# Patient Record
Sex: FEMALE | Race: WHITE
[De-identification: names, ages, dates, MRNs, and addresses within clinical notes are randomized per-mention and may not be internally consistent; named-entity substitution may affect disease eponyms.]

---

## 2017-06-25 ENCOUNTER — HOSPITAL ENCOUNTER (EMERGENCY)
Dept: HOSPITAL 14 - H.ER | Age: 67
Discharge: HOME | End: 2017-06-25
Payer: COMMERCIAL

## 2017-06-25 VITALS
HEART RATE: 78 BPM | OXYGEN SATURATION: 99 % | DIASTOLIC BLOOD PRESSURE: 67 MMHG | RESPIRATION RATE: 19 BRPM | SYSTOLIC BLOOD PRESSURE: 136 MMHG | TEMPERATURE: 98.4 F
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
## 2017-06-25 DIAGNOSIS — M54.2: ICD-10-CM

## 2017-06-25 DIAGNOSIS — S09.90XA: Primary | ICD-10-CM
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
## 2017-06-25 NOTE — ED PDOC
HPI:  Headache


Time Seen by Provider: 06/25/17 15:25


Chief Complaint (Provider): Headache 


History Per: Patient


History/Exam Limitations: no limitations


Onset/Duration Of Symptoms: Days (3)


Current Symptoms Are (Timing): Still Present


Severity: Moderate


Additional Complaint(s): 


[place] is a 65 y/o female presenting to the ER on 6/25/2017 with 

complaints of a headache, dizziness, and neck pain for three days. Patient 

reports falling off a ladder to her bunk bed three days ago. She states she 

fell backwards and hit her head on the ground. At the time, she did not 

experience any lost of consciousness. However, she is complaining today of 

dizziness, neck pain, and headaches. She denies any weakness or paresthesia. 





Past Medical History


Reviewed: Historical Data, Nursing Documentation, Vital Signs


Vital Signs: 





 Last Vital Signs











Temp  98.4 F   06/25/17 15:18


 


Pulse  78   06/25/17 15:18


 


Resp  19   06/25/17 15:18


 


BP  136/67   06/25/17 15:18


 


Pulse Ox  99   06/25/17 15:18














- Medical History


PMH: Asthma





- Surgical History


Surgical History: No Surg Hx





- Family History


Family History: States: Unknown Family Hx





- Social History


Current smoker - smoking cessation education provided: No


Alcohol: None


Drugs: Denies





- Home Medications


Home Medications: 


 Ambulatory Orders











 Medication  Instructions  Recorded


 


Cyclobenzaprine HCl [Flexeril] 10 mg PO TID #21 tab 08/10/15


 


Naproxen 500 mg PO Q12 #20 tab 08/10/15


 


Naproxen [Naprosyn] 500 mg PO Q12H #20 tab 06/25/17














- Allergies


Allergies/Adverse Reactions: 


 Allergies











Allergy/AdvReac Type Severity Reaction Status Date / Time


 


codeine AdvReac  DIARRHEA Verified 06/25/17 15:14














Review of Systems


ROS Statement: Except As Marked, All Systems Reviewed And Found Negative


Cardiovascular: Negative for: Light Headedness


Musculoskeletal: Positive for: Neck Pain


Neurological: Positive for: Headache, Dizziness, Other ((-) paraesthesia ).  

Negative for: Weakness





Physical Exam





- Reviewed


Nursing Documentation Reviewed: Yes


Vital Signs Reviewed: Yes





- Physical Exam


Appears: Positive for: Non-toxic, No Acute Distress


Head Exam: Positive for: ATRAUMATIC, NORMOCEPHALIC


Skin: Positive for: Normal Color.  Negative for: Rash


Eye Exam: Positive for: Normal appearance, EOMI, PERRL


Neck: Positive for: Normal (no spinal tenderness or deformity), Painless ROM, 

Supple


Cardiovascular/Chest: Positive for: Regular Rate, Rhythm.  Negative for: Murmur


Respiratory: Positive for: Normal Breath Sounds.  Negative for: Wheezing, 

Respiratory Distress


Gastrointestinal/Abdominal: Positive for: Normal Exam, Soft.  Negative for: 

Tenderness


Back: Positive for: Normal Inspection (no spinal tenderness or deformity).  

Negative for: L CVA Tenderness, R CVA Tenderness, Vertebral Tenderness


Extremity: Positive for: Normal ROM.  Negative for: Tenderness, Deformity


Neurologic/Psych: Positive for: Alert, Oriented, Other (no focal deficits ).  

Negative for: Motor/Sensory Deficits





- ECG


O2 Sat by Pulse Oximetry: 99





Medical Decision Making


Medical Decision Making: 


15:25





Initial Impression- 65 y/o female with headaches, dizziness, and neck pain s/p 

fall 





Initial Plan-


* CT Cervical Spine w/o contrast


* CT Head w/o contrast


* Re-evaluate





Documented by Juan Pablo Daley, acting as a scribe for Shayan Alvarez MD.





All medical record entries made by the Scribe were at my direction and 

personally dictated by me. I


have reviewed the chart and agree that the record accurately reflects my 

personal performance of the


history, physical exam, medical decision making, and the department course for 

this patient. I have


also personally directed, reviewed, and agree with the discharge instructions 

and disposition.





Disposition





- Clinical Impression


Clinical Impression: 


 Head injury, Neck pain








- Patient ED Disposition


Is Patient to be Admitted: No





- Disposition


Referrals: 


[facility] [Outside]


Disposition: Routine/Home


Disposition Time: 17:50


Condition: FAIR


Prescriptions: 


Naproxen [Naprosyn] 500 mg PO Q12H #20 tab


Instructions:  Head Injury (ED), Concussion (ED), Cervical Sprain (ED)


Print Language: Armenian

## 2017-06-25 NOTE — CT
PROCEDURE:  CT HEAD WITHOUT CONTRAST.



HISTORY:

r/o bleed



COMPARISON:

None available. 



TECHNIQUE:

Axial computed tomography images were obtained through the head/brain 

without intravenous contrast.  



Radiation dose:



Total exam DLP = 1172.07 mGy-cm.



This CT exam was performed using one or more of the following dose 

reduction techniques: Automated exposure control, adjustment of the 

mA and/or kV according to patient size, and/or use of iterative 

reconstruction technique.



FINDINGS:



HEMORRHAGE:

No acute parenchymal, subarachnoid or extra-axial hemorrhage. 



BRAIN:

No evidence of large acute infarct.  No focal parenchymal nor 

extra-axial masses or collections identified on this noncontrast 

study.  Ventricular and sulcal size are within range of normal for 

this patient's stated age.



Minor vascular calcifications are present.



VENTRICLES:

No evidence of obstructive hydrocephalus. 



CALVARIUM:

Calvarium appears grossly intact.



PARANASAL SINUSES:

Frontal sinuses are hypoplastic.  Remaining visualized paranasal 

sinuses well-developed and currently well-aerated. No fluid levels 

seen to suggest acute hemorrhage or sinusitis. 



MASTOID AIR CELLS:

Unremarkable as visualized. No inflammatory changes.



OTHER FINDINGS:

Note made of a small approximately 4 mm elliptical shaped 

calcification which appears to be located on the superior surface of 

the left tentorial leaf.  This could represent a simple dural base 

calcification however the possibility of a very tiny incidental 

meningioma calcified meningioma cannot be completely excluded. .  

Followup nonemergent pre and post-contrast MRI of the brain could be 

performed further evaluation if clinically indicated. . 



IMPRESSION:

No acute intracranial hemorrhage. 



Note made of a small approximately 4 mm elliptical shaped 

calcification which appears to be located on the superior surface of 

the left tentorial leaf.  This could represent a simple dural base 

calcification however the possibility of a very tiny incidental 

meningioma calcified meningioma cannot be completely excluded. .  

Followup nonemergent pre and post-contrast MRI of the brain could be 

performed further evaluation if clinically indicated.

## 2017-06-25 NOTE — CT
PROCEDURE:  CT Cervical Spine without contrast



HISTORY:

Trauma



COMPARISON:

None available.



TECHNIQUE:

Axial computed tomography images were obtained of the cervical spine 

without the use of intravenous contrast. Coronal and sagittal 

reformatted images were created and reviewed.



Radiation dose: 



Total exam DLP = 662.4 mGy-cm.



This CT exam was performed using one or more of the following dose 

reduction techniques: Automated exposure control, adjustment of the 

mA and/or kV according to patient size, and/or use of iterative 

reconstruction technique.



FINDINGS:



VERTEBRAE:

No acute compression fractures no retropulsed fragments. .  Vertebral 

bodies exhibit relatively normal stature. Minor anterior subluxation 

C7 over T1.  Remaining vertebral bodies otherwise exhibit normal 

alignment.  Facets normally aligned. 



DISCS/SPINAL CANAL/NEURAL FORAMINA:

Multilevel degenerative spondylosis.



At the C2-C3 level, there is adequate disc height.  Minimal central 

and bilateral disc bulge indents the ventral surface of the thecal 

sac nearly reaching but not significantly compressing the ventral 

surface of the cord. Central canal appears and at exit foramina 

appear adequate.



At the C3-C4 level, there is relatively adequate disc height. Small 

focal central and bilateral disc bulge indents the ventral surface of 

the thecal sac and minimally indents the ventral surface of cord.  

Central canal is marginal to slightly narrowed. Exit foramina appear 

adequate.



At the C4-C5 level, there is relatively adequate disc height. Small 

central and bilateral disc bulge also indents the ventral surface of 

the thecal sac reaching but not significantly compressing the ventral 

surface of the cord. Central canal appears adequate. Left facet joint 

is hypertrophic.  The uncovertebral joints also mildly hypertrophic.  

Exit foramina are marginal to minimally narrowed. 



At the C5-C6 level, there is minor disc space narrowing.  Small 

broad-based disc bulge ridge complex contiguous with mildly 

hypertrophic uncovertebral joints left larger than right.  The left 

facet is also hypertrophic.  The central canal appears adequate. Exit 

foramina are narrowed bilaterally left greater than right. 



Similar changes seen at the C6-C7 level.  Slight anterior subluxation 

C7-T1 level. 



PARASPINAL SOFT TISSUES:

Unremarkable. 



OTHER FINDINGS:

None.



IMPRESSION:

No acute fractures.  Mild multilevel degenerative spondylosis as 

detailed above. 



Slight anterior subluxation C7 over T1.

## 2018-05-01 ENCOUNTER — HOSPITAL ENCOUNTER (INPATIENT)
Dept: HOSPITAL 31 - C.ER | Age: 68
LOS: 3 days | Discharge: HOME | DRG: 394 | End: 2018-05-04
Attending: FAMILY MEDICINE | Admitting: FAMILY MEDICINE
Payer: COMMERCIAL

## 2018-05-01 VITALS — BODY MASS INDEX: 26.7 KG/M2

## 2018-05-01 DIAGNOSIS — J44.9: ICD-10-CM

## 2018-05-01 DIAGNOSIS — Z98.51: ICD-10-CM

## 2018-05-01 DIAGNOSIS — K52.9: ICD-10-CM

## 2018-05-01 DIAGNOSIS — K44.9: ICD-10-CM

## 2018-05-01 DIAGNOSIS — K21.9: ICD-10-CM

## 2018-05-01 DIAGNOSIS — K92.1: ICD-10-CM

## 2018-05-01 DIAGNOSIS — K76.0: ICD-10-CM

## 2018-05-01 DIAGNOSIS — K63.3: Primary | ICD-10-CM

## 2018-05-01 DIAGNOSIS — Z87.891: ICD-10-CM

## 2018-05-01 DIAGNOSIS — K64.8: ICD-10-CM

## 2018-05-01 DIAGNOSIS — K29.50: ICD-10-CM

## 2018-05-01 LAB
ALBUMIN SERPL-MCNC: 4.1 G/DL (ref 3.5–5)
ALBUMIN/GLOB SERPL: 1.1 {RATIO} (ref 1–2.1)
ALT SERPL-CCNC: 43 U/L (ref 9–52)
APTT BLD: 30 SECONDS (ref 21–34)
AST SERPL-CCNC: 66 U/L (ref 14–36)
BASOPHILS # BLD AUTO: 0.1 K/UL (ref 0–0.2)
BASOPHILS NFR BLD: 0.6 % (ref 0–2)
BILIRUB UR-MCNC: NEGATIVE MG/DL
BUN SERPL-MCNC: 14 MG/DL (ref 7–17)
CALCIUM SERPL-MCNC: 8.7 MG/DL (ref 8.6–10.4)
EOSINOPHIL # BLD AUTO: 0.3 K/UL (ref 0–0.7)
EOSINOPHIL NFR BLD: 2.7 % (ref 0–4)
ERYTHROCYTE [DISTWIDTH] IN BLOOD BY AUTOMATED COUNT: 13.4 % (ref 11.5–14.5)
GFR NON-AFRICAN AMERICAN: > 60
GLUCOSE UR STRIP-MCNC: NORMAL MG/DL
HGB BLD-MCNC: 13.7 G/DL (ref 11–16)
INR PPP: 1.1
LEUKOCYTE ESTERASE UR-ACNC: (no result) LEU/UL
LIPASE: 35 U/L (ref 23–300)
LYMPHOCYTES # BLD AUTO: 2.2 K/UL (ref 1–4.3)
LYMPHOCYTES NFR BLD AUTO: 18.1 % (ref 20–40)
MCH RBC QN AUTO: 30.8 PG (ref 27–31)
MCHC RBC AUTO-ENTMCNC: 33.8 G/DL (ref 33–37)
MCV RBC AUTO: 91.1 FL (ref 81–99)
MONOCYTES # BLD: 0.9 K/UL (ref 0–0.8)
MONOCYTES NFR BLD: 7.5 % (ref 0–10)
NEUTROPHILS # BLD: 8.4 K/UL (ref 1.8–7)
NEUTROPHILS NFR BLD AUTO: 71.1 % (ref 50–75)
NRBC BLD AUTO-RTO: 0 % (ref 0–2)
PH UR STRIP: 5 [PH] (ref 5–8)
PLATELET # BLD: 268 K/UL (ref 130–400)
PMV BLD AUTO: 7.6 FL (ref 7.2–11.7)
PROT UR STRIP-MCNC: NEGATIVE MG/DL
PROTHROMBIN TIME: 12.8 SECONDS (ref 9.7–12.2)
RBC # BLD AUTO: 4.45 MIL/UL (ref 3.8–5.2)
RBC # UR STRIP: (no result) /UL
SP GR UR STRIP: 1.03 (ref 1–1.03)
SQUAMOUS EPITHIAL: 1 /HPF (ref 0–5)
UROBILINOGEN UR-MCNC: NORMAL MG/DL (ref 0.2–1)
WBC # BLD AUTO: 11.9 K/UL (ref 4.8–10.8)

## 2018-05-01 NOTE — CP.PCM.HP
<NoahSantiago - Last Filed: 05/01/18 22:21>





History of Present Illness





- History of Present Illness


History of Present Illness: 


CC: Abdominal [id number]





PMD: Dr. Meyer


Code Status: DNR/DNI as per patient however Dr. Tran wishes patient to think 

about it more before entering into computer formally 





This patient is a 68yo F w/ a PMhx of COPD/Asthma, former smoker 50+ pack years 

quit 10 years ago who is coming to the hospital with a 1d history of LLQ pain. 

She denies fevers/chills, HA, CP, SOB, N/V/D, dysuria/frequency/urgency, or 

lower extremity pain/swelling. She has never had pain like this before. She 

went to her PMD, who told her to come to the ER today. She was recently in 

[facility], 3 months ago, and states that she had "stomach problems" there and 

took unknown antibiotics which made it better. Other than that, she has never 

had this before. 





As per patient her stools were "dark and tarry" black in color, every time she 

went to the bathroom. Of note, ER did rectal and found brown stool with a 

negative FOBT. 





PMhx: Asthma/COPD


Meds: Tramadol PRN, Prednisone 10mg PRN for exacerbations,  Pulmicort and advair

, proair PRN


Allergies: Codeine; hypotension


Surgeries: C Section, Knee surgery





Present on Admission





- Present on Admission


Any Indicators Present on Admission: Yes


History of DVT/PE: No


History of Uncontrolled Diabetes: No


Urinary Catheter: No


Decubitus Ulcer Present: No





Past Patient History





- Past Social History


Smoking Status: Former Smoker





- PULMONARY


Hx Asthma: Yes





- MUSCULOSKELETAL/RHEUMATOLOGICAL


Hx Musculoskeletal Disorders: Yes


Hx Degenerative Joint Disease: Yes (Cervical/left shoulder)





- PSYCHIATRIC


Hx Substance Use: No





- SURGICAL HISTORY


Hx Surgeries: No





- ANESTHESIA


Hx Anesthesia: No





Meds


Allergies/Adverse Reactions: 


 Allergies











Allergy/AdvReac Type Severity Reaction Status Date / Time


 


codeine AdvReac  DIARRHEA Verified 05/01/18 17:34














Physical Exam





- Constitutional


Appears: Non-toxic, No Acute Distress





- Head Exam


Head Exam: ATRAUMATIC, NORMAL INSPECTION





- Eye Exam


Eye Exam: EOMI, Normal appearance, PERRL, Scleral icterus





- ENT Exam


ENT Exam: Mucous Membranes Moist





- Neck Exam


Neck exam: Positive for: Full Rom.  Negative for: Lymphadenopathy, Thyromegaly





- Respiratory Exam


Respiratory Exam: Clear to Auscultation Bilateral, Wheezes (minor expiratory 

wheezes), NORMAL BREATHING PATTERN.  absent: Rales, Rhonchi





- Cardiovascular Exam


Cardiovascular Exam: REGULAR RHYTHM, +S1, +S2





- GI/Abdominal Exam


GI & Abdominal Exam: Normal Bowel Sounds, Tenderness (LLQ).  absent: Firm, 

Guarding, Hernia, Hyperactive Bowel Sounds, Mass, Pulsatile Mass, Rebound, Rigid





- Extremities Exam


Extremities exam: Positive for: full ROM, normal inspection.  Negative for: 

calf tenderness, pedal edema, tenderness





- Back Exam


Back exam: NORMAL INSPECTION.  absent: CVA tenderness (L), CVA tenderness (R)





- Neurological Exam


Neurological exam: Alert, CN II-XII Intact, Oriented x3





Results





- Vital Signs


Recent Vital Signs: 





 Last Vital Signs











Temp  98.6 F   05/01/18 20:51


 


Pulse  78   05/01/18 20:51


 


Resp  18   05/01/18 20:51


 


BP  129/79   05/01/18 20:51


 


Pulse Ox  98   05/01/18 20:51














- Labs


Result Diagrams: 


 05/01/18 18:28





 05/01/18 18:28


Labs: 





 Laboratory Results - last 24 hr











  05/01/18 05/01/18 05/01/18





  18:21 18:28 18:28


 


WBC   11.9 H 


 


RBC   4.45 


 


Hgb   13.7 


 


Hct   40.6 


 


MCV   91.1 


 


MCH   30.8 


 


MCHC   33.8 


 


RDW   13.4 


 


Plt Count   268 


 


MPV   7.6 


 


Neut % (Auto)   71.1 


 


Lymph % (Auto)   18.1 L 


 


Mono % (Auto)   7.5 


 


Eos % (Auto)   2.7 


 


Baso % (Auto)   0.6 


 


Neut # (Auto)   8.4 H 


 


Lymph # (Auto)   2.2 


 


Mono # (Auto)   0.9 H 


 


Eos # (Auto)   0.3 


 


Baso # (Auto)   0.1 


 


PT   


 


INR   


 


APTT   


 


Sodium    142


 


Potassium    5.4 H


 


Chloride    101


 


Carbon Dioxide    30


 


Anion Gap    16


 


BUN    14


 


Creatinine    0.7


 


Est GFR ( Amer)    > 60


 


Est GFR (Non-Af Amer)    > 60


 


Random Glucose    100


 


Calcium    8.7


 


Total Bilirubin    1.6 H


 


AST    66 H


 


ALT    43


 


Alkaline Phosphatase    79


 


Troponin I    < 0.0120


 


Total Protein    7.8


 


Albumin    4.1


 


Globulin    3.8


 


Albumin/Globulin Ratio    1.1


 


Lipase    35


 


Urine Color  Cheyanne  


 


Urine Clarity  Hazy  


 


Urine pH  5.0  


 


Ur Specific Gravity  1.029  


 


Urine Protein  Negative  


 


Urine Glucose (UA)  Normal  


 


Urine Ketones  Negative  


 


Urine Blood  Trace H  


 


Urine Nitrate  Negative  


 


Urine Bilirubin  Negative  


 


Urine Urobilinogen  Normal  


 


Ur Leukocyte Esterase  Trace  


 


Urine WBC (Auto)  2  


 


Urine RBC (Auto)  2  


 


Ur Squamous Epith Cells  1  


 


Stool Occult Blood   


 


Blood Type   


 


Antibody Screen   














  05/01/18 05/01/18 05/01/18





  18:28 18:28 18:28


 


WBC   


 


RBC   


 


Hgb   


 


Hct   


 


MCV   


 


MCH   


 


MCHC   


 


RDW   


 


Plt Count   


 


MPV   


 


Neut % (Auto)   


 


Lymph % (Auto)   


 


Mono % (Auto)   


 


Eos % (Auto)   


 


Baso % (Auto)   


 


Neut # (Auto)   


 


Lymph # (Auto)   


 


Mono # (Auto)   


 


Eos # (Auto)   


 


Baso # (Auto)   


 


PT  12.8 H  


 


INR  1.1  


 


APTT  30  


 


Sodium   


 


Potassium   


 


Chloride   


 


Carbon Dioxide   


 


Anion Gap   


 


BUN   


 


Creatinine   


 


Est GFR ( Amer)   


 


Est GFR (Non-Af Amer)   


 


Random Glucose   


 


Calcium   


 


Total Bilirubin   


 


AST   


 


ALT   


 


Alkaline Phosphatase   


 


Troponin I   


 


Total Protein   


 


Albumin   


 


Globulin   


 


Albumin/Globulin Ratio   


 


Lipase   


 


Urine Color   


 


Urine Clarity   


 


Urine pH   


 


Ur Specific Gravity   


 


Urine Protein   


 


Urine Glucose (UA)   


 


Urine Ketones   


 


Urine Blood   


 


Urine Nitrate   


 


Urine Bilirubin   


 


Urine Urobilinogen   


 


Ur Leukocyte Esterase   


 


Urine WBC (Auto)   


 


Urine RBC (Auto)   


 


Ur Squamous Epith Cells   


 


Stool Occult Blood    Negative


 


Blood Type   O POSITIVE 


 


Antibody Screen   Negative 














Assessment & Plan





- Assessment and Plan (Free Text)


Assessment: 


68yo F admitted for colitis with possible GI bleed 





Colitis


-elevated WBC, no tachycardia, no fever, solid blood pressure


-f/u stool studies


-f/u GI consult; Dr. Scott; thank you for your help


-FOBT negative in ER, ER physician states was brown stool not black that he had 

on exam with no blood


   -given Zosyn in ER


-will continue with Ceftriaxone and Metronidazole as per Dr. Tran


-f/u blood cultures


-of note patient has never had colonoscopy


-maintanence fluids 150ml/hr 2/2 to NPO  





Hx of Asthma/COPD


-c/w home pumps


-duonebs PRN for wheezing


-O2 100% on RA





elevated liver enzymes


-f/u stool studies


-f/u hepatitis





Proph


-Protonix IV 40mg as per Dr. Tran


-SCD as possible bleed


-please revisit code status conversation again as patient expressed wishes to 

be DNR/DNI but does not have advanced directive with her. 





Discussed and seen with Dr. Chelsea Salazar PGY2 





Decision To Admit





- Pt Status Changed To:


Hospital Disposition Of: Observation





- .


Bed Request Type: Regular


Admitting Physician: Titus Tran





<Titus Tran - Last Filed: 05/02/18 06:31>





Results





- Vital Signs


Recent Vital Signs: 





 Last Vital Signs











Temp  98.5 F   05/01/18 23:53


 


Pulse  78   05/01/18 23:53


 


Resp  20   05/01/18 23:53


 


BP  102/64   05/01/18 23:53


 


Pulse Ox  99   05/02/18 00:26














- Labs


Result Diagrams: 


 05/01/18 18:28





 05/01/18 18:28


Labs: 





 Laboratory Results - last 24 hr











  05/01/18 05/01/18 05/01/18





  18:21 18:28 18:28


 


WBC   11.9 H 


 


RBC   4.45 


 


Hgb   13.7 


 


Hct   40.6 


 


MCV   91.1 


 


MCH   30.8 


 


MCHC   33.8 


 


RDW   13.4 


 


Plt Count   268 


 


MPV   7.6 


 


Neut % (Auto)   71.1 


 


Lymph % (Auto)   18.1 L 


 


Mono % (Auto)   7.5 


 


Eos % (Auto)   2.7 


 


Baso % (Auto)   0.6 


 


Neut # (Auto)   8.4 H 


 


Lymph # (Auto)   2.2 


 


Mono # (Auto)   0.9 H 


 


Eos # (Auto)   0.3 


 


Baso # (Auto)   0.1 


 


PT   


 


INR   


 


APTT   


 


Sodium    142


 


Potassium    5.4 H


 


Chloride    101


 


Carbon Dioxide    30


 


Anion Gap    16


 


BUN    14


 


Creatinine    0.7


 


Est GFR ( Amer)    > 60


 


Est GFR (Non-Af Amer)    > 60


 


Random Glucose    100


 


Calcium    8.7


 


Total Bilirubin    1.6 H


 


AST    66 H


 


ALT    43


 


Alkaline Phosphatase    79


 


Troponin I    < 0.0120


 


Total Protein    7.8


 


Albumin    4.1


 


Globulin    3.8


 


Albumin/Globulin Ratio    1.1


 


Lipase    35


 


Urine Color  Cheyanne  


 


Urine Clarity  Hazy  


 


Urine pH  5.0  


 


Ur Specific Gravity  1.029  


 


Urine Protein  Negative  


 


Urine Glucose (UA)  Normal  


 


Urine Ketones  Negative  


 


Urine Blood  Trace H  


 


Urine Nitrate  Negative  


 


Urine Bilirubin  Negative  


 


Urine Urobilinogen  Normal  


 


Ur Leukocyte Esterase  Trace  


 


Urine WBC (Auto)  2  


 


Urine RBC (Auto)  2  


 


Ur Squamous Epith Cells  1  


 


Stool Occult Blood   


 


Blood Type   


 


Antibody Screen   














  05/01/18 05/01/18 05/01/18





  18:28 18:28 18:28


 


WBC   


 


RBC   


 


Hgb   


 


Hct   


 


MCV   


 


MCH   


 


MCHC   


 


RDW   


 


Plt Count   


 


MPV   


 


Neut % (Auto)   


 


Lymph % (Auto)   


 


Mono % (Auto)   


 


Eos % (Auto)   


 


Baso % (Auto)   


 


Neut # (Auto)   


 


Lymph # (Auto)   


 


Mono # (Auto)   


 


Eos # (Auto)   


 


Baso # (Auto)   


 


PT  12.8 H  


 


INR  1.1  


 


APTT  30  


 


Sodium   


 


Potassium   


 


Chloride   


 


Carbon Dioxide   


 


Anion Gap   


 


BUN   


 


Creatinine   


 


Est GFR ( Amer)   


 


Est GFR (Non-Af Amer)   


 


Random Glucose   


 


Calcium   


 


Total Bilirubin   


 


AST   


 


ALT   


 


Alkaline Phosphatase   


 


Troponin I   


 


Total Protein   


 


Albumin   


 


Globulin   


 


Albumin/Globulin Ratio   


 


Lipase   


 


Urine Color   


 


Urine Clarity   


 


Urine pH   


 


Ur Specific Gravity   


 


Urine Protein   


 


Urine Glucose (UA)   


 


Urine Ketones   


 


Urine Blood   


 


Urine Nitrate   


 


Urine Bilirubin   


 


Urine Urobilinogen   


 


Ur Leukocyte Esterase   


 


Urine WBC (Auto)   


 


Urine RBC (Auto)   


 


Ur Squamous Epith Cells   


 


Stool Occult Blood    Negative


 


Blood Type   O POSITIVE 


 


Antibody Screen   Negative 














Attending/Attestation





- Attestation


I have personally seen and examined this patient.: Yes


I have fully participated in the care of the patient.: Yes


I have reviewed all pertinent clinical information: Yes


Notes (Text): 





Assessment


* Diagnosed colitis a day before in different hospital, presented with coliky 

pain and mentions black tarry stool, clinically less likely of the UGI bleed as 

BUN is normal, patient is hemodynamically stable and stool in ER was brown, 

guiac negative, but will give benefit of doubt to patient.


* H/o asthma


Plan


* Rocephin, flagyl


* PPI


* Stool studies


* GI consult


* DVT prophylaxis scd, till sure about gi bleeding.


* See orders for detail.

## 2018-05-01 NOTE — C.PDOC
History Of Present Illness


66 y/o female, w/PMhx of asthma, presents to the ER for evaluation of lower 

abdominal pain and dark stools which have been present for the past 2 weeks. 

Patient has been referred by her PMD. Patient denies having fever, hemoptysis, 

and other complaints at this time.


Time Seen by Provider: 18 17:56


Chief Complaint (Nursing): Abdominal Pain


History Per: Patient


History/Exam Limitations: no limitations


Onset/Duration Of Symptoms: Days


Current Symptoms Are (Timing): Still Present





Past Medical History


Reviewed: Historical Data, Nursing Documentation, Vital Signs


Vital Signs: 


 Last Vital Signs











Temp  98.5 F   18 23:53


 


Pulse  78   18 23:53


 


Resp  20   18 23:53


 


BP  102/64   18 23:53


 


Pulse Ox  97   18 23:53














- Medical History


PMH: Asthma, Back Problems


Surgical History: No Surg Hx





- [facility] Procedures








NAIL REMOVAL (14)


ONYCHOPLASTY (14)








Family History: States: No Known Family Hx





- Social History


Hx Alcohol Use: No


Hx Substance Use: No





- Immunization History


Hx Tetanus Toxoid Vaccination: No


Hx Influenza Vaccination: No


Hx Pneumococcal Vaccination: Yes (4-years ago)





Review Of Systems


Except As Marked, All Systems Reviewed And Found Negative.


Constitutional: Negative for: Fever, Chills


Respiratory: Negative for: Hemoptysis


Gastrointestinal: Positive for: Abdominal Pain, Other (dark colored stool)





Physical Exam





- Physical Exam


Appears: Non-toxic, No Acute Distress


Skin: Normal Color, Warm, Dry


Head: Atraumatic, Normacephalic


Eye(s): bilateral: Normal Inspection


Nose: Normal


Oral Mucosa: Moist


Neck: Supple


Chest: Symmetrical


Cardiovascular: Rhythm Regular


Respiratory: Normal Breath Sounds, No Rales, No Rhonchi, No Wheezing


Gastrointestinal/Abdominal: Soft, Tenderness (epigastric tenderness)


Neurological/Psych: Oriented x3, Normal Speech





ED Course And Treatment





- Laboratory Results


Result Diagrams: 


 18 18:28





 18 18:28


ECG: Interpreted By Me, Viewed By Me


ECG Rhythm: Sinus Rhythm


Interpretation Of ECG: No ST/T wave changes


Rate From EC (BPM)


O2 Sat by Pulse Oximetry: 99 (RA)


Pulse Ox Interpretation: Normal





Medical Decision Making


Medical Decision Making: 


ro gi bleed vs colitis - labs maging pending. 


Plan:


--Labs


--ECG


--UA


- IV Fluids


--Protonix IV





failure of outpt treatment, sent inby pmd, dr begum accepts. guiacneg brown 

stool





Disposition





- Disposition


Disposition: HOSPITALIZED


Disposition Time: 11:00


Condition: STABLE





- Clinical Impression


Clinical Impression: 


 Colitis








- Scribe Statement


The provider has reviewed the documentation as recorded by the Royal Cazares


Provider Attestation: 





All medical record entries made by the Royal were at my direction and 

personally dictated by me. I have reviewed the chart and agree that the record 

accurately reflects my personal performance of the history, physical exam, 

medical decision making, and the department course for this patient. I have 

also personally directed, reviewed, and agree with the discharge instructions 

and disposition.

## 2018-05-02 LAB
ALBUMIN SERPL-MCNC: 3.1 G/DL (ref 3.5–5)
ALBUMIN/GLOB SERPL: 1 {RATIO} (ref 1–2.1)
ALT SERPL-CCNC: 71 U/L (ref 9–52)
AST SERPL-CCNC: 106 U/L (ref 14–36)
BASOPHILS # BLD AUTO: 0.1 K/UL (ref 0–0.2)
BASOPHILS NFR BLD: 0.7 % (ref 0–2)
BUN SERPL-MCNC: 9 MG/DL (ref 7–17)
CALCIUM SERPL-MCNC: 8.3 MG/DL (ref 8.6–10.4)
EOSINOPHIL # BLD AUTO: 0.4 K/UL (ref 0–0.7)
EOSINOPHIL NFR BLD: 4.7 % (ref 0–4)
ERYTHROCYTE [DISTWIDTH] IN BLOOD BY AUTOMATED COUNT: 13.3 % (ref 11.5–14.5)
GFR NON-AFRICAN AMERICAN: > 60
HDLC SERPL-MCNC: 54 MG/DL (ref 30–70)
HEPATITIS A IGM: NEGATIVE
HEPATITIS B CORE AB: NEGATIVE
HEPATITIS B CORE AB: NEGATIVE
HEPATITIS C ANTIBODY: NEGATIVE
HEPATITIS C ANTIBODY: NEGATIVE
HGB BLD-MCNC: 12.4 G/DL (ref 11–16)
LDLC SERPL-MCNC: 55 MG/DL (ref 0–129)
LYMPHOCYTES # BLD AUTO: 1.6 K/UL (ref 1–4.3)
LYMPHOCYTES NFR BLD AUTO: 20 % (ref 20–40)
MCH RBC QN AUTO: 31.5 PG (ref 27–31)
MCHC RBC AUTO-ENTMCNC: 34.6 G/DL (ref 33–37)
MCV RBC AUTO: 91.3 FL (ref 81–99)
MONOCYTES # BLD: 0.7 K/UL (ref 0–0.8)
MONOCYTES NFR BLD: 8.5 % (ref 0–10)
NEUTROPHILS # BLD: 5.4 K/UL (ref 1.8–7)
NEUTROPHILS NFR BLD AUTO: 66.1 % (ref 50–75)
NRBC BLD AUTO-RTO: 0 % (ref 0–2)
PLATELET # BLD: 223 K/UL (ref 130–400)
PMV BLD AUTO: 7.6 FL (ref 7.2–11.7)
RBC # BLD AUTO: 3.94 MIL/UL (ref 3.8–5.2)
WBC # BLD AUTO: 8.1 K/UL (ref 4.8–10.8)

## 2018-05-02 RX ADMIN — IPRATROPIUM BROMIDE AND ALBUTEROL SULFATE SCH ML: .5; 3 SOLUTION RESPIRATORY (INHALATION) at 13:10

## 2018-05-02 RX ADMIN — CIPROFLOXACIN SCH MLS/HR: 2 INJECTION, SOLUTION INTRAVENOUS at 14:27

## 2018-05-02 RX ADMIN — METHYLPREDNISOLONE SODIUM SUCCINATE SCH MG: 40 INJECTION, POWDER, FOR SOLUTION INTRAMUSCULAR; INTRAVENOUS at 13:18

## 2018-05-02 RX ADMIN — WATER SCH MLS/HR: 1 INJECTION INTRAMUSCULAR; INTRAVENOUS; SUBCUTANEOUS at 16:14

## 2018-05-02 RX ADMIN — IPRATROPIUM BROMIDE AND ALBUTEROL SULFATE SCH ML: .5; 3 SOLUTION RESPIRATORY (INHALATION) at 19:28

## 2018-05-02 RX ADMIN — Medication SCH MG: at 13:17

## 2018-05-02 RX ADMIN — WATER SCH MLS/HR: 1 INJECTION INTRAMUSCULAR; INTRAVENOUS; SUBCUTANEOUS at 00:23

## 2018-05-02 RX ADMIN — WATER SCH MLS/HR: 1 INJECTION INTRAMUSCULAR; INTRAVENOUS; SUBCUTANEOUS at 08:21

## 2018-05-02 RX ADMIN — IPRATROPIUM BROMIDE AND ALBUTEROL SULFATE SCH ML: .5; 3 SOLUTION RESPIRATORY (INHALATION) at 07:19

## 2018-05-02 RX ADMIN — WATER SCH MLS/HR: 1 INJECTION INTRAMUSCULAR; INTRAVENOUS; SUBCUTANEOUS at 23:15

## 2018-05-02 RX ADMIN — Medication SCH MG: at 17:37

## 2018-05-02 RX ADMIN — METHYLPREDNISOLONE SODIUM SUCCINATE SCH MG: 40 INJECTION, POWDER, FOR SOLUTION INTRAMUSCULAR; INTRAVENOUS at 23:29

## 2018-05-02 RX ADMIN — IPRATROPIUM BROMIDE AND ALBUTEROL SULFATE SCH ML: .5; 3 SOLUTION RESPIRATORY (INHALATION) at 16:12

## 2018-05-02 NOTE — CP.PCM.PN
Subjective





- Date & Time of Evaluation


Date of Evaluation: 05/02/18


Time of Evaluation: 17:12





- Subjective


Subjective: 





Patient seen and examined at bedside


complaining of SOB and some LLQ abdominal pain


No other complaints at this time





Objective





- Vital Signs/Intake and Output


Vital Signs (last 24 hours): 


 











Temp Pulse Resp BP Pulse Ox


 


 97.8 F   74   20   133/81   98 


 


 05/02/18 15:54  05/02/18 15:54  05/02/18 15:54  05/02/18 15:54  05/02/18 15:54








Intake and Output: 


 











 05/02/18 05/02/18





 06:59 18:59


 


Intake Total  2750


 


Balance  2750














- Medications


Medications: 


 Current Medications





Albuterol/Ipratropium (Duoneb 3 Mg/0.5 Mg (3 Ml) Ud)  3 ml INH RQ4 [facility]


   Last Admin: 05/02/18 16:12 Dose:  3 ml


Metronidazole (Flagyl)  500 mg in 100 mls @ 100 mls/hr IVPB Q8H [facility]


   PRN Reason: Protocol


   Last Admin: 05/02/18 16:14 Dose:  100 mls/hr


Sodium Chloride (Sodium Chloride 0.9%)  1,000 mls @ 100 mls/hr IV .Q10H [facility]


Ciprofloxacin (Cipro 400mg/200ml Dsw)  400 mg in 200 mls @ 133 mls/hr IVPB Q12H 

[facility]


   PRN Reason: Protocol


   Last Admin: 05/02/18 14:27 Dose:  133 mls/hr


Methylprednisolone (Solu-Medrol)  40 mg IVP Q12H [facility]


   Last Admin: 05/02/18 13:18 Dose:  40 mg


Pantoprazole Sodium (Protonix Inj)  40 mg IVP DAILY [facility]


   Last Admin: 05/02/18 10:11 Dose:  40 mg


Polyethylene Glycol/Electrolytes (Golytely)  2,000 ml PO ONCE ONE


   Stop: 05/02/18 18:01


Polyethylene Glycol/Electrolytes (Golytely)  2,000 ml PO ONCE ONE


   Stop: 05/03/18 06:01


Saccharomyces Boulardii (Florastor)  250 mg PO BID [facility]


   Last Admin: 05/02/18 13:17 Dose:  250 mg











- Labs


Labs: 


 





 05/02/18 06:59 





 05/02/18 06:59 





 











PT  12.8 SECONDS (9.7-12.2)  H  05/01/18  18:28    


 


INR  1.1   05/01/18  18:28    


 


APTT  30 SECONDS (21-34)   05/01/18  18:28    














- Constitutional


Appears: Well





- Head Exam


Head Exam: ATRAUMATIC, NORMAL INSPECTION, NORMOCEPHALIC





- Eye Exam


Eye Exam: EOMI, Normal appearance, PERRL


Pupil Exam: NORMAL ACCOMODATION, PERRL





- ENT Exam


ENT Exam: Mucous Membranes Moist, Normal Exam





- Neck Exam


Neck Exam: Full ROM, Normal Inspection.  absent: Lymphadenopathy





- Respiratory Exam


Respiratory Exam: Wheezes





- Cardiovascular Exam


Cardiovascular Exam: REGULAR RHYTHM, +S1, +S2.  absent: Murmur





- GI/Abdominal Exam


GI & Abdominal Exam: Soft, Normal Bowel Sounds.  absent: Distended, Tenderness





- Extremities Exam


Extremities Exam: Full ROM, Normal Capillary Refill, Normal Inspection.  absent

: Joint Swelling, Pedal Edema





- Back Exam


Back Exam: NORMAL INSPECTION





- Neurological Exam


Neurological Exam: Alert, Awake, CN II-XII Intact, Normal Gait, Oriented x3





- Psychiatric Exam


Psychiatric exam: Normal Affect, Normal Mood





- Skin


Skin Exam: Dry, Intact, Normal Color, Warm





Assessment and Plan


(1) Colitis


Assessment & Plan: 


EGD/Colonoscopy By Dr. Alaniz tomorrow


F/u autoimmune studies and stool studies


cipro/flagyl


protonix


Status: Acute   





(2) COPD (chronic obstructive pulmonary disease)


Assessment & Plan: 


duonebs


solumedrol 40 q12


Status: Acute   





(3) Prophylactic measure


Assessment & Plan: 


protonix


scd


VTE CI due to blood in stool


Status: Acute

## 2018-05-02 NOTE — CARD
--------------- APPROVED REPORT --------------





EKG Measurement

Heart [id number]

NV [id number]

[id number]

[id number]

[id number]



<Conclusion>

Normal sinus rhythm

Possible Left atrial enlargement

Borderline ECG

## 2018-05-02 NOTE — CT
PROCEDURE:  CT Abdomen and Pelvis with contrast



HISTORY:

abd pain, diarrhea



COMPARISON:

None.



TECHNIQUE:

Contrast dose: 100 mL Omnipaque 300



Radiation dose:



Total exam DLP = 523.8 mGy-cm.



This CT exam was performed using one or more of the following dose 

reduction techniques: Automated exposure control, adjustment of the 

mA and/or kV according to patient size, and/or use of iterative 

reconstruction technique.



FINDINGS:



LOWER THORAX:

Unremarkable. 



LIVER:

Hepatic steatosis. No gross lesion or ductal dilatation. 



GALLBLADDER AND BILE DUCTS:

Prior cholecystectomy with expected prominence of the CBD. 



PANCREAS:

Unremarkable. No gross lesion or ductal dilatation.



SPLEEN:

Unremarkable. 



ADRENALS:

Unremarkable. No mass. 



KIDNEYS AND URETERS:

Small anterior left interpolar cyst. No hydronephrosis. No solid 

mass. 



VASCULATURE:

Unremarkable. No aortic aneurysm. 



BOWEL:

Ascending to proximal transverse colonic wall thickening. No 

obstruction. No gross mural thickening. 



APPENDIX:

Normal appendix. 



PERITONEUM:

Unremarkable. No free fluid. No free air. 



LYMPH NODES:

Unremarkable. No enlarged lymph nodes. 



BLADDER:

Unremarkable. 



REPRODUCTIVE:

Unremarkable. 



BONES:

Degenerative changes. No acute fracture. 



OTHER FINDINGS:

None.



IMPRESSION:

Ascending to proximal transverse colitis.

## 2018-05-02 NOTE — CP.PCM.CON
History of Present Illness





- History of Present Illness


History of Present Illness: 





66 yo Hisp female admitted for lower abdominal pain and dark tarry, loose 

stools over the past 4-5 days. Patient reports she took one dose of Peptobismol 

a couple of days ago. Recent travel to [facility] followed by diarrhea that was 

treated with antibiotics 1-2 months ago. Pain has been escalating over the past 

week and now is lower midline to RLQ and R flank. +nausea but no vomiting. 

Denies fever or chills. CT Scan shows fatty liver and thickening of ascending 

and prox tv colon cw colitis. Patient denies having had a colonoscopy in the 

past. No GI or liver disease in the past. No transfusions, alcohol or drugs. 

PMH significant for asthma/COPD. No CP or SOB. No JAMES. Stool occult blood in ER 

was negative. 





Review of Systems





- Cardiovascular


Cardiovascular: absent: Chest Pain, Dyspnea, Palpitations





- Gastrointestinal


Gastrointestinal: As Per HPI, Abdominal Pain, Change in Bowel Habits, Heartburn

, Loose Stools, Melena, Nausea





- Genitourinary


Genitourinary: absent: Dysuria, Hematuria





- Musculoskeletal


Musculoskeletal: Arthralgias





Past Patient History





- Past Medical History & Family History


Past Medical History?: Yes





- Past Social History


Smoking Status: Former Smoker


Alcohol: None


Drugs: Denies





- CARDIAC


Hx Cardiac Disorders: No





- PULMONARY


Hx Respiratory Disorders: Yes


Hx Asthma: Yes


Hx Chronic Obstructive Pulmonary Disease (COPD): Yes





- NEUROLOGICAL


Hx Neurological Disorder: No





- HEENT


Hx HEENT Problems: No





- RENAL


Hx Chronic Kidney Disease: No





- ENDOCRINE/METABOLIC


Hx Endocrine Disorders: No





- HEMATOLOGICAL/ONCOLOGICAL


Hx Blood Disorders: No


Hx Cirrhosis: No


Hx Hepatitis A: No


Hx Hepatitis B: No


Hx Hepatitis C: No


Hx Human Immunodeficiency Virus (HIV): No





- INTEGUMENTARY


Hx Dermatological Problems: No





- MUSCULOSKELETAL/RHEUMATOLOGICAL


Hx Musculoskeletal Disorders: Yes


Hx Degenerative Joint Disease: Yes (Cervical/left shoulder)


Hx Falls: No





- GASTROINTESTINAL


Hx Gastrointestinal Disorders: No


Hx Bowel Surgery: No


Hx Clostridium Difficile: No


Hx Colitis: No


Hx Colostomy: No


Hx Constipation: No


Hx Crohn's Disease: No


Hx Diarrhea: No


Hx Diverticulitis: No


Hx Esophageal Varices: No


Hx Fatty Liver Disease: No


Hx Gall Bladder Disease: No


Hx Gastritis: No


Hx Gastroesophageal Reflux: No


Hx Hemorrhoids: No


Hx Ileostomy: No


Hx Irritable Bowel: No


Hx Liver Failure: No


Hx Nausea: No


Hx Pancreatitis: No


HX Swallowing Problems: No


Hx Ulcer: No


Hx Vomiting: No





- GENITOURINARY/GYNECOLOGICAL


Hx Genitourinary Disorders: No





- PSYCHIATRIC


Hx Psychophysiologic Disorder: No


Hx Substance Use: No





- SURGICAL HISTORY


Hx Surgeries: Yes


Hx Tubal Ligation: Yes





- ANESTHESIA


Hx Anesthesia: Yes


Hx Anesthesia Reactions: No





Meds


Allergies/Adverse Reactions: 


 Allergies











Allergy/AdvReac Type Severity Reaction Status Date / Time


 


codeine AdvReac  DIARRHEA Verified 05/01/18 17:34














- Medications


Medications: 


 Current Medications





Acetaminophen (Tylenol 325mg Tab)  650 mg PO Q6 PRN


   PRN Reason: Fever >100.4 F


Albuterol/Ipratropium (Duoneb 3 Mg/0.5 Mg (3 Ml) Ud)  3 ml INH RQ4 [facility]


   Last Admin: 05/02/18 07:19 Dose:  3 ml


Home Med (Budesonide/Formoterol Fumarate [Symbicort 160-4.5 Mcg Inhaler])  1 

aer IH PRN PRN


   PRN Reason: Shortness of Breath


Sodium Chloride (Sodium Chloride 0.9%)  1,000 mls @ 150 mls/hr IV .Q6H40M [facility]


   Last Admin: 05/02/18 06:09 Dose:  150 mls/hr


Metronidazole (Flagyl)  500 mg in 100 mls @ 100 mls/hr IVPB Q8H [facility]


   PRN Reason: Protocol


   Last Admin: 05/02/18 08:21 Dose:  100 mls/hr


Ceftriaxone Sodium (Rocephin Iv 1 Gm Duplex)  50 mls @ 100 mls/hr IVPB DAILY [facility]


   PRN Reason: Protocol


Ondansetron HCl (Zofran Inj)  4 mg IVP Q6 PRN


   PRN Reason: Nausea/Vomiting


Pantoprazole Sodium (Protonix Inj)  40 mg IVP DAILY [facility]


Tramadol HCl (Ultram)  50 mg PO BID [facility]











Physical Exam





- Constitutional


Appears: No Acute Distress





- Head Exam


Head Exam: ATRAUMATIC, NORMOCEPHALIC





- Eye Exam


Eye Exam: EOMI, PERRL





- Respiratory Exam


Respiratory Exam: Clear to Auscultation Bilateral, NORMAL BREATHING PATTERN





- Cardiovascular Exam


Cardiovascular Exam: REGULAR RHYTHM, +S1





- GI/Abdominal Exam


GI & Abdominal Exam: Normal Bowel Sounds, Soft, Tenderness.  absent: Distended, 

Hernia, Mass, Rebound


Additional comments: 





Tender to palpation below umbilicus/suprapubic area and RLQ. No mass or 

rebound. 





- Rectal Exam


Rectal Exam: NORMAL INSPECTION





- Extremities Exam


Extremities exam: Positive for: normal inspection.  Negative for: pedal edema, 

tenderness





- Neurological Exam


Neurological exam: Alert, Oriented x3





- Psychiatric Exam


Psychiatric exam: Normal Affect, Normal Mood





- Skin


Skin Exam: Dry, Warm





Results





- Vital Signs


Recent Vital Signs: 


 Last Vital Signs











Temp  98.4 F   05/02/18 08:00


 


Pulse  76   05/02/18 08:00


 


Resp  20   05/02/18 08:00


 


BP  143/81   05/02/18 08:00


 


Pulse Ox  97   05/02/18 08:00














- Labs


Result Diagrams: 


 05/02/18 06:59





 05/02/18 06:59


Labs: 


 Laboratory Results - last 24 hr











  05/01/18 05/01/18 05/01/18





  18:21 18:28 18:28


 


WBC   11.9 H 


 


RBC   4.45 


 


Hgb   13.7 


 


Hct   40.6 


 


MCV   91.1 


 


MCH   30.8 


 


MCHC   33.8 


 


RDW   13.4 


 


Plt Count   268 


 


MPV   7.6 


 


Neut % (Auto)   71.1 


 


Lymph % (Auto)   18.1 L 


 


Mono % (Auto)   7.5 


 


Eos % (Auto)   2.7 


 


Baso % (Auto)   0.6 


 


Neut # (Auto)   8.4 H 


 


Lymph # (Auto)   2.2 


 


Mono # (Auto)   0.9 H 


 


Eos # (Auto)   0.3 


 


Baso # (Auto)   0.1 


 


PT   


 


INR   


 


APTT   


 


Sodium    142


 


Potassium    5.4 H


 


Chloride    101


 


Carbon Dioxide    30


 


Anion Gap    16


 


BUN    14


 


Creatinine    0.7


 


Est GFR ( Amer)    > 60


 


Est GFR (Non-Af Amer)    > 60


 


Random Glucose    100


 


Hemoglobin A1c   


 


Calcium    8.7


 


Total Bilirubin    1.6 H


 


AST    66 H


 


ALT    43


 


Alkaline Phosphatase    79


 


Troponin I    < 0.0120


 


Total Protein    7.8


 


Albumin    4.1


 


Globulin    3.8


 


Albumin/Globulin Ratio    1.1


 


Triglycerides   


 


Cholesterol   


 


LDL Cholesterol Direct   


 


HDL Cholesterol   


 


Lipase    35


 


Urine Color  [place]  


 


Urine Clarity  Hazy  


 


Urine pH  5.0  


 


Ur Specific Gravity  1.029  


 


Urine Protein  Negative  


 


Urine Glucose (UA)  Normal  


 


Urine Ketones  Negative  


 


Urine Blood  Trace H  


 


Urine Nitrate  Negative  


 


Urine Bilirubin  Negative  


 


Urine Urobilinogen  Normal  


 


Ur Leukocyte Esterase  Trace  


 


Urine WBC (Auto)  2  


 


Urine RBC (Auto)  2  


 


Ur Squamous Epith Cells  1  


 


Stool Occult Blood   


 


Hepatitis A IgM Ab   


 


Hep Bs Antigen   


 


Hep B Core IgM Ab   


 


Hepatitis C Antibody   


 


Blood Type   


 


Antibody Screen   














  05/01/18 05/01/18 05/01/18





  18:28 18:28 18:28


 


WBC   


 


RBC   


 


Hgb   


 


Hct   


 


MCV   


 


MCH   


 


MCHC   


 


RDW   


 


Plt Count   


 


MPV   


 


Neut % (Auto)   


 


Lymph % (Auto)   


 


Mono % (Auto)   


 


Eos % (Auto)   


 


Baso % (Auto)   


 


Neut # (Auto)   


 


Lymph # (Auto)   


 


Mono # (Auto)   


 


Eos # (Auto)   


 


Baso # (Auto)   


 


PT  12.8 H  


 


INR  1.1  


 


APTT  30  


 


Sodium   


 


Potassium   


 


Chloride   


 


Carbon Dioxide   


 


Anion Gap   


 


BUN   


 


Creatinine   


 


Est GFR ( Amer)   


 


Est GFR (Non-Af Amer)   


 


Random Glucose   


 


Hemoglobin A1c   


 


Calcium   


 


Total Bilirubin   


 


AST   


 


ALT   


 


Alkaline Phosphatase   


 


Troponin I   


 


Total Protein   


 


Albumin   


 


Globulin   


 


Albumin/Globulin Ratio   


 


Triglycerides   


 


Cholesterol   


 


LDL Cholesterol Direct   


 


HDL Cholesterol   


 


Lipase   


 


Urine Color   


 


Urine Clarity   


 


Urine pH   


 


Ur Specific Gravity   


 


Urine Protein   


 


Urine Glucose (UA)   


 


Urine Ketones   


 


Urine Blood   


 


Urine Nitrate   


 


Urine Bilirubin   


 


Urine Urobilinogen   


 


Ur Leukocyte Esterase   


 


Urine WBC (Auto)   


 


Urine RBC (Auto)   


 


Ur Squamous Epith Cells   


 


Stool Occult Blood    Negative


 


Hepatitis A IgM Ab   


 


Hep Bs Antigen   


 


Hep B Core IgM Ab   


 


Hepatitis C Antibody   


 


Blood Type   O POSITIVE 


 


Antibody Screen   Negative 














  05/02/18 05/02/18 05/02/18





  06:59 06:59 06:59


 


WBC   


 


RBC   


 


Hgb   


 


Hct   


 


MCV   


 


MCH   


 


MCHC   


 


RDW   


 


Plt Count   


 


MPV   


 


Neut % (Auto)   


 


Lymph % (Auto)   


 


Mono % (Auto)   


 


Eos % (Auto)   


 


Baso % (Auto)   


 


Neut # (Auto)   


 


Lymph # (Auto)   


 


Mono # (Auto)   


 


Eos # (Auto)   


 


Baso # (Auto)   


 


PT   


 


INR   


 


APTT   


 


Sodium  143  


 


Potassium  3.6  


 


Chloride  107  


 


Carbon Dioxide  26  


 


Anion Gap  13  


 


BUN  9  


 


Creatinine  0.7  


 


Est GFR ( Amer)  > 60  


 


Est GFR (Non-Af Amer)  > 60  


 


Random Glucose  91  


 


Hemoglobin A1c    5.8


 


Calcium  8.3 L  


 


Total Bilirubin  1.6 H  


 


AST  106 H D  


 


ALT  71 H D  


 


Alkaline Phosphatase  111  


 


Troponin I   


 


Total Protein  6.2 L  


 


Albumin  3.1 L D  


 


Globulin  3.1  


 


Albumin/Globulin Ratio  1.0  


 


Triglycerides  65  


 


Cholesterol  142  


 


LDL Cholesterol Direct  55  


 


HDL Cholesterol  54  


 


Lipase   


 


Urine Color   


 


Urine Clarity   


 


Urine pH   


 


Ur Specific Gravity   


 


Urine Protein   


 


Urine Glucose (UA)   


 


Urine Ketones   


 


Urine Blood   


 


Urine Nitrate   


 


Urine Bilirubin   


 


Urine Urobilinogen   


 


Ur Leukocyte Esterase   


 


Urine WBC (Auto)   


 


Urine RBC (Auto)   


 


Ur Squamous Epith Cells   


 


Stool Occult Blood   


 


Hepatitis A IgM Ab   Negative 


 


Hep Bs Antigen   Negative 


 


Hep B Core IgM Ab   Negative 


 


Hepatitis C Antibody   Negative 


 


Blood Type   


 


Antibody Screen   














  05/02/18





  06:59


 


WBC  8.1


 


RBC  3.94


 


Hgb  12.4


 


Hct  35.9


 


MCV  91.3


 


MCH  31.5 H


 


MCHC  34.6


 


RDW  13.3


 


Plt Count  223


 


MPV  7.6


 


Neut % (Auto)  66.1


 


Lymph % (Auto)  20.0


 


Mono % (Auto)  8.5


 


Eos % (Auto)  4.7 H


 


Baso % (Auto)  0.7


 


Neut # (Auto)  5.4


 


Lymph # (Auto)  1.6


 


Mono # (Auto)  0.7


 


Eos # (Auto)  0.4


 


Baso # (Auto)  0.1


 


PT 


 


INR 


 


APTT 


 


Sodium 


 


Potassium 


 


Chloride 


 


Carbon Dioxide 


 


Anion Gap 


 


BUN 


 


Creatinine 


 


Est GFR ( Amer) 


 


Est GFR (Non-Af Amer) 


 


Random Glucose 


 


Hemoglobin A1c 


 


Calcium 


 


Total Bilirubin 


 


AST 


 


ALT 


 


Alkaline Phosphatase 


 


Troponin I 


 


Total Protein 


 


Albumin 


 


Globulin 


 


Albumin/Globulin Ratio 


 


Triglycerides 


 


Cholesterol 


 


LDL Cholesterol Direct 


 


HDL Cholesterol 


 


Lipase 


 


Urine Color 


 


Urine Clarity 


 


Urine pH 


 


Ur Specific Gravity 


 


Urine Protein 


 


Urine Glucose (UA) 


 


Urine Ketones 


 


Urine Blood 


 


Urine Nitrate 


 


Urine Bilirubin 


 


Urine Urobilinogen 


 


Ur Leukocyte Esterase 


 


Urine WBC (Auto) 


 


Urine RBC (Auto) 


 


Ur Squamous Epith Cells 


 


Stool Occult Blood 


 


Hepatitis A IgM Ab 


 


Hep Bs Antigen 


 


Hep B Core IgM Ab 


 


Hepatitis C Antibody 


 


Blood Type 


 


Antibody Screen 














- Imaging and Cardiology


  ** CT scan - abdomen


Status: Image reviewed by me, Report reviewed by me





  ** CT scan - pelvis


Status: Image reviewed by me, Report reviewed by me





Assessment & Plan


(1) Lower abdominal pain


Assessment and Plan: 


Patient with lower/right sided abdominal pain and CT Scan showing thickening of 

the right colon. R/O acute colitis due to infections, IBD, ischemia, occult 

malignancy. Stools black and tarry but initial exam was OB- and H/H WNL. 

Patient took one dose of Pepto which can make stool black but unlikely for five 

days. 


Emperic antibiotics started by primary care team


Stool studies ordered for bacterial , parasitic sources, C diff 


Patient does not want to return for out patient work up so if able to tolerate 

a preparation will plan for EGD and Colonoscopy tomorrow to assess source of 

pain, possible bleeding, CT findings and heartburn. 


Status: Acute   





(2) Melena


Assessment and Plan: 


as above


Status: Acute   





(3) Abnormal CT scan, colon


Assessment and Plan: 


as above


Status: Acute   





(4) Heartburn


Assessment and Plan: 


r/o PUD, gastritis, Acid reflux disease (GERD, esophagitis, Barretts, occult ca)


On Protonix


EGD scheduled for tomorrow


Status: Chronic   





(5) Abnormal transaminases


Assessment and Plan: 


elevated AST/ALT likely due to fatty liver seen on CT. r/o underlying viral, 

autoimmume liver disease, medication induced, sepsis, etc.


Markers of CLD as ordered, follow LFTs.


Status: Acute

## 2018-05-03 VITALS — RESPIRATION RATE: 20 BRPM

## 2018-05-03 LAB
ALBUMIN SERPL-MCNC: 3.9 G/DL (ref 3.5–5)
ALBUMIN/GLOB SERPL: 1.1 {RATIO} (ref 1–2.1)
ALT SERPL-CCNC: 163 U/L (ref 9–52)
AST SERPL-CCNC: 160 U/L (ref 14–36)
BASOPHILS # BLD AUTO: 0 K/UL (ref 0–0.2)
BASOPHILS NFR BLD: 0.2 % (ref 0–2)
BUN SERPL-MCNC: 9 MG/DL (ref 7–17)
CALCIUM SERPL-MCNC: 8.8 MG/DL (ref 8.6–10.4)
EOSINOPHIL # BLD AUTO: 0 K/UL (ref 0–0.7)
EOSINOPHIL NFR BLD: 0 % (ref 0–4)
ERYTHROCYTE [DISTWIDTH] IN BLOOD BY AUTOMATED COUNT: 13.4 % (ref 11.5–14.5)
GFR NON-AFRICAN AMERICAN: > 60
HGB BLD-MCNC: 12.9 G/DL (ref 11–16)
LYMPHOCYTE: 7 % (ref 20–40)
LYMPHOCYTES # BLD AUTO: 0.5 K/UL (ref 1–4.3)
LYMPHOCYTES NFR BLD AUTO: 5.7 % (ref 20–40)
MCH RBC QN AUTO: 31.3 PG (ref 27–31)
MCHC RBC AUTO-ENTMCNC: 34 G/DL (ref 33–37)
MCV RBC AUTO: 92 FL (ref 81–99)
MONOCYTE: 1 % (ref 0–10)
MONOCYTES # BLD: 0.1 K/UL (ref 0–0.8)
MONOCYTES NFR BLD: 1.6 % (ref 0–10)
NEUTROPHILS # BLD: 8 K/UL (ref 1.8–7)
NEUTROPHILS NFR BLD AUTO: 90 % (ref 50–75)
NEUTROPHILS NFR BLD AUTO: 92.5 % (ref 50–75)
NEUTS BAND NFR BLD: 2 % (ref 0–2)
NRBC BLD AUTO-RTO: 0 % (ref 0–2)
PLATELET # BLD EST: NORMAL 10*3/UL
PLATELET # BLD: 246 K/UL (ref 130–400)
PMV BLD AUTO: 7.6 FL (ref 7.2–11.7)
RBC # BLD AUTO: 4.12 MIL/UL (ref 3.8–5.2)
TOTAL CELLS COUNTED BLD: 100
WBC # BLD AUTO: 8.6 K/UL (ref 4.8–10.8)

## 2018-05-03 PROCEDURE — 0DBH8ZX EXCISION OF CECUM, VIA NATURAL OR ARTIFICIAL OPENING ENDOSCOPIC, DIAGNOSTIC: ICD-10-PCS | Performed by: INTERNAL MEDICINE

## 2018-05-03 PROCEDURE — 0DBK8ZX EXCISION OF ASCENDING COLON, VIA NATURAL OR ARTIFICIAL OPENING ENDOSCOPIC, DIAGNOSTIC: ICD-10-PCS | Performed by: INTERNAL MEDICINE

## 2018-05-03 PROCEDURE — 0DB68ZX EXCISION OF STOMACH, VIA NATURAL OR ARTIFICIAL OPENING ENDOSCOPIC, DIAGNOSTIC: ICD-10-PCS | Performed by: INTERNAL MEDICINE

## 2018-05-03 RX ADMIN — METHYLPREDNISOLONE SODIUM SUCCINATE SCH MG: 40 INJECTION, POWDER, FOR SOLUTION INTRAMUSCULAR; INTRAVENOUS at 10:01

## 2018-05-03 RX ADMIN — IPRATROPIUM BROMIDE AND ALBUTEROL SULFATE SCH ML: .5; 3 SOLUTION RESPIRATORY (INHALATION) at 16:21

## 2018-05-03 RX ADMIN — WATER SCH MLS/HR: 1 INJECTION INTRAMUSCULAR; INTRAVENOUS; SUBCUTANEOUS at 07:47

## 2018-05-03 RX ADMIN — METHYLPREDNISOLONE SODIUM SUCCINATE SCH: 40 INJECTION, POWDER, FOR SOLUTION INTRAMUSCULAR; INTRAVENOUS at 13:40

## 2018-05-03 RX ADMIN — Medication SCH MG: at 10:03

## 2018-05-03 RX ADMIN — IPRATROPIUM BROMIDE AND ALBUTEROL SULFATE SCH ML: .5; 3 SOLUTION RESPIRATORY (INHALATION) at 19:48

## 2018-05-03 RX ADMIN — IPRATROPIUM BROMIDE AND ALBUTEROL SULFATE SCH ML: .5; 3 SOLUTION RESPIRATORY (INHALATION) at 03:32

## 2018-05-03 RX ADMIN — IPRATROPIUM BROMIDE AND ALBUTEROL SULFATE SCH ML: .5; 3 SOLUTION RESPIRATORY (INHALATION) at 07:44

## 2018-05-03 RX ADMIN — CIPROFLOXACIN SCH MLS/HR: 2 INJECTION, SOLUTION INTRAVENOUS at 00:38

## 2018-05-03 RX ADMIN — IPRATROPIUM BROMIDE AND ALBUTEROL SULFATE SCH: .5; 3 SOLUTION RESPIRATORY (INHALATION) at 11:10

## 2018-05-03 RX ADMIN — METHYLPREDNISOLONE SODIUM SUCCINATE SCH MG: 40 INJECTION, POWDER, FOR SOLUTION INTRAMUSCULAR; INTRAVENOUS at 23:55

## 2018-05-03 RX ADMIN — IPRATROPIUM BROMIDE AND ALBUTEROL SULFATE SCH ML: .5; 3 SOLUTION RESPIRATORY (INHALATION) at 00:04

## 2018-05-03 RX ADMIN — Medication SCH MG: at 17:01

## 2018-05-03 NOTE — CP.PCM.PN
<Abadier,Michael - Last Filed: 05/03/18 09:11>





Subjective





- Date & Time of Evaluation


Date of Evaluation: 05/03/18


Time of Evaluation: 09:11





- Subjective


Subjective: 





Patient seen and examined at bedside


states with the duonebs and steroids she is breathing much better, no more SOB 

or wheezing


tolerated prep well, going for EGD and Cscope today


No other complaints at this time


Denies any episodes of bloody BM or hematemesis. 





Objective





- Vital Signs/Intake and Output


Vital Signs (last 24 hours): 


 











Temp Pulse Resp BP Pulse Ox


 


 98.3 F   77   20   158/83 H  98 


 


 05/03/18 08:00  05/03/18 08:00  05/03/18 08:00  05/03/18 08:00  05/03/18 08:00








Intake and Output: 


 











 05/03/18 05/03/18





 06:59 18:59


 


Intake Total 1700 


 


Balance 1700 














- Medications


Medications: 


 Current Medications





Albuterol/Ipratropium (Duoneb 3 Mg/0.5 Mg (3 Ml) Ud)  3 ml INH RQ4 [facility]


   Last Admin: 05/03/18 07:44 Dose:  3 ml


Metronidazole (Flagyl)  500 mg in 100 mls @ 100 mls/hr IVPB Q8H [facility]


   PRN Reason: Protocol


   Last Admin: 05/03/18 07:47 Dose:  100 mls/hr


Sodium Chloride (Sodium Chloride 0.9%)  1,000 mls @ 100 mls/hr IV .Q10H [facility]


   Last Admin: 05/02/18 21:36 Dose:  Not Given


Ciprofloxacin (Cipro 400mg/200ml Dsw)  400 mg in 200 mls @ 133 mls/hr IVPB Q12H 

[facility]


   PRN Reason: Protocol


   Last Admin: 05/03/18 00:38 Dose:  133 mls/hr


Methylprednisolone (Solu-Medrol)  40 mg IVP Q12H [facility]


   Last Admin: 05/02/18 23:29 Dose:  40 mg


Pantoprazole Sodium (Protonix Inj)  40 mg IVP DAILY [facility]


   Last Admin: 05/02/18 10:11 Dose:  40 mg


Saccharomyces Boulardii (Florastor)  250 mg PO BID [facility]


   Last Admin: 05/02/18 17:37 Dose:  250 mg











- Labs


Labs: 


 





 05/03/18 08:52 





 05/02/18 06:59 





 











PT  12.8 SECONDS (9.7-12.2)  H  05/01/18  18:28    


 


INR  1.1   05/01/18  18:28    


 


APTT  30 SECONDS (21-34)   05/01/18  18:28    














- Constitutional


Appears: Well





- Head Exam


Head Exam: ATRAUMATIC, NORMAL INSPECTION, NORMOCEPHALIC





- Eye Exam


Eye Exam: EOMI, Normal appearance, PERRL


Pupil Exam: NORMAL ACCOMODATION, PERRL





- ENT Exam


ENT Exam: Mucous Membranes Moist, Normal Exam





- Neck Exam


Neck Exam: Full ROM, Normal Inspection.  absent: Lymphadenopathy





- Respiratory Exam


Respiratory Exam: Clear to Ausculation Bilateral, NORMAL BREATHING PATTERN





- Cardiovascular Exam


Cardiovascular Exam: REGULAR RHYTHM, +S1, +S2.  absent: Murmur





- GI/Abdominal Exam


GI & Abdominal Exam: Soft, Normal Bowel Sounds.  absent: Tenderness





- Extremities Exam


Extremities Exam: Full ROM, Normal Capillary Refill, Normal Inspection.  absent

: Joint Swelling, Pedal Edema





- Back Exam


Back Exam: NORMAL INSPECTION





- Neurological Exam


Neurological Exam: Alert, Awake, CN II-XII Intact, Normal Gait, Oriented x3





- Psychiatric Exam


Psychiatric exam: Normal Affect, Normal Mood





- Skin


Skin Exam: Dry, Intact, Normal Color, Warm





Assessment and Plan


(1) Lower abdominal pain


Assessment & Plan: 


GI ([place])





CT - thickening of R. colon


* initial FOBT negative, 2nd was Positive


* Cscope today


* Acute colitis 2/2 infection vs IBD vs ischemia vs occult malig


* stool studies (bacterial, parasitic) - c.diff negative, negative stool leuk





Empiric Cipro/flagyl at this time 


Status: Acute   





(2) Abnormal transaminases


Assessment & Plan: 


Most likely seondary to fatty liver as seen on CT


R/O viral/autoimmune liver disease/medication induced/sepsis


Hep panel negative. Hep A ab + consistent with prior resolved infection as IgM 

is negative


HIV Pending


will follow LFTs


Status: Acute   





(3) Heartburn


Assessment & Plan: 


R/O PUD vs Gastritis, vs Acid reflux disease including GERD, Esophagitis, 

Barrets or occult malig


EGD today


Status: Chronic   





(4) COPD (chronic obstructive pulmonary disease)


Assessment & Plan: 


Duonebs Q4


Solumedrol 40 IV QD


Status: Acute   





(5) Prophylactic measure


Assessment & Plan: 


VTE CI gi bleed


Protonix 40 IV QD


SCD


Status: Acute   





<Jorgensen,Rohith J - Last Filed: 05/03/18 19:56>





Objective





- Vital Signs/Intake and Output


Vital Signs (last 24 hours): 


 











Temp Pulse Resp BP Pulse Ox


 


 98.2 F   78   20   112/69   97 


 


 05/03/18 16:08  05/03/18 16:08  05/03/18 16:08  05/03/18 16:08  05/03/18 16:08








Intake and Output: 


 











 05/03/18 05/04/18





 18:59 06:59


 


Intake Total 700 


 


Balance 700 














- Medications


Medications: 


 Current Medications





Albuterol/Ipratropium (Duoneb 3 Mg/0.5 Mg (3 Ml) Ud)  3 ml INH RQ4 [facility]


   Last Admin: 05/03/18 19:48 Dose:  3 ml


Ciprofloxacin (Cipro)  500 mg PO BID [facility]


   PRN Reason: Protocol


   Last Admin: 05/03/18 17:00 Dose:  500 mg


Methylprednisolone (Solu-Medrol)  40 mg IVP Q12H [facility]


   Last Admin: 05/03/18 13:40 Dose:  Not Given


Pantoprazole Sodium (Protonix Inj)  40 mg IVP DAILY [facility]


   Last Admin: 05/03/18 16:56 Dose:  40 mg


Saccharomyces Boulardii (Florastor)  250 mg PO BID [facility]


   Last Admin: 05/03/18 17:01 Dose:  250 mg











- Labs


Labs: 


 





 05/03/18 08:52 





 05/03/18 08:52 





 











PT  12.8 SECONDS (9.7-12.2)  H  05/01/18  18:28    


 


INR  1.1   05/01/18  18:28    


 


APTT  30 SECONDS (21-34)   05/01/18  18:28    














Attending/Attestation





- Attestation


I have personally seen and examined this patient.: Yes


I have fully participated in the care of the patient.: Yes


I have reviewed all pertinent clinical information, including history, physical 

exam and plan: Yes


Notes (Text): 





05/03/18 19:55


EGD and Colonoscopy findings noted.





Patient to be discharged in the morning 5/4/18.





Rohith Jorgensen D.O.

## 2018-05-03 NOTE — CP.PCM.PN
Subjective





- Date & Time of Evaluation


Date of Evaluation: 05/03/18


Time of Evaluation: 11:19





- Subjective


Subjective: 





EGD and Colonoscopy:





Hiatal hernia with GERD but no esophagitis


Mild antral gastritis, biopsy taken for H pylori. 


No bleeding.








Multiple superficial ulcers throughout ascending colon and cecum. Biopsies 

taken. No bleeding, mass.


Non bleeding internal hemorrhoids. 


Likely source of pain and occult blood loss. 








Rec/


Advance diet as tolerated


Avoid ASA or NSAIDs


Out patient follow up with me in two weeks


Stable for discharge if diet tolerated from GI point of view.





Objective





- Vital Signs/Intake and Output


Vital Signs (last 24 hours): 


 











Temp Pulse Resp BP Pulse Ox


 


 98.3 F   97 H  20   191/87 H  100 


 


 05/03/18 10:46  05/03/18 10:46  05/03/18 10:46  05/03/18 10:46  05/03/18 10:46








Intake and Output: 


 











 05/03/18 05/03/18





 06:59 18:59


 


Intake Total 1700 


 


Balance 1700 














- Medications


Medications: 


 Current Medications





Albuterol/Ipratropium (Duoneb 3 Mg/0.5 Mg (3 Ml) Ud)  3 ml INH RQ4 Pending sale to [facility]


   Last Admin: 05/03/18 11:10 Dose:  Not Given


Metronidazole (Flagyl)  500 mg in 100 mls @ 100 mls/hr IVPB Q8H GOPAL


   PRN Reason: Protocol


   Last Admin: 05/03/18 07:47 Dose:  100 mls/hr


Ciprofloxacin (Cipro 400mg/200ml Dsw)  400 mg in 200 mls @ 133 mls/hr IVPB Q12H 

GOPAL


   PRN Reason: Protocol


   Last Admin: 05/03/18 00:38 Dose:  133 mls/hr


Lactated Ringer's (Lactated Ringer's)  1,000 mls @ 80 mls/hr IV .[id number] Pending sale to [facility]


   Last Admin: 05/03/18 10:00 Dose:  80 mls/hr


Methylprednisolone (Solu-Medrol)  40 mg IVP Q12H Pending sale to [facility]


   Last Admin: 05/03/18 10:01 Dose:  40 mg


Pantoprazole Sodium (Protonix Inj)  40 mg IVP DAILY Pending sale to [facility]


   Last Admin: 05/03/18 09:59 Dose:  40 mg


Saccharomyces Boulardii (Florastor)  250 mg PO BID Pending sale to [facility]


   Last Admin: 05/03/18 10:03 Dose:  250 mg











- Labs


Labs: 


 





 05/03/18 08:52 





 05/03/18 08:52 





 











PT  12.8 SECONDS (9.7-12.2)  H  05/01/18  18:28    


 


INR  1.1   05/01/18  18:28    


 


APTT  30 SECONDS (21-34)   05/01/18  18:28    














Assessment and Plan


(1) Lower abdominal pain


Status: Acute   





(2) Melena


Status: Acute   





(3) Abnormal CT scan, colon


Status: Acute   





(4) Heartburn


Status: Chronic   





(5) Abnormal transaminases


Status: Acute

## 2018-05-04 VITALS — TEMPERATURE: 98.2 F | DIASTOLIC BLOOD PRESSURE: 76 MMHG | SYSTOLIC BLOOD PRESSURE: 125 MMHG | HEART RATE: 86 BPM

## 2018-05-04 VITALS — OXYGEN SATURATION: 96 %

## 2018-05-04 LAB
ALBUMIN SERPL-MCNC: 3.6 G/DL (ref 3.5–5)
ALBUMIN/GLOB SERPL: 1.2 {RATIO} (ref 1–2.1)
ALT SERPL-CCNC: 119 U/L (ref 9–52)
AST SERPL-CCNC: 63 U/L (ref 14–36)
BASOPHILS # BLD AUTO: 0 K/UL (ref 0–0.2)
BASOPHILS NFR BLD: 0.2 % (ref 0–2)
BUN SERPL-MCNC: 17 MG/DL (ref 7–17)
CALCIUM SERPL-MCNC: 8.8 MG/DL (ref 8.6–10.4)
EOSINOPHIL # BLD AUTO: 0 K/UL (ref 0–0.7)
EOSINOPHIL NFR BLD: 0 % (ref 0–4)
ERYTHROCYTE [DISTWIDTH] IN BLOOD BY AUTOMATED COUNT: 13.4 % (ref 11.5–14.5)
GFR NON-AFRICAN AMERICAN: > 60
HGB BLD-MCNC: 12.1 G/DL (ref 11–16)
LYMPHOCYTE: 3 % (ref 20–40)
LYMPHOCYTES # BLD AUTO: 0.4 K/UL (ref 1–4.3)
LYMPHOCYTES NFR BLD AUTO: 3.2 % (ref 20–40)
MCH RBC QN AUTO: 31.1 PG (ref 27–31)
MCHC RBC AUTO-ENTMCNC: 34.1 G/DL (ref 33–37)
MCV RBC AUTO: 91.4 FL (ref 81–99)
MONOCYTE: 2 % (ref 0–10)
MONOCYTES # BLD: 0.3 K/UL (ref 0–0.8)
MONOCYTES NFR BLD: 2 % (ref 0–10)
NEUTROPHILS # BLD: 11.9 K/UL (ref 1.8–7)
NEUTROPHILS NFR BLD AUTO: 94.6 % (ref 50–75)
NEUTROPHILS NFR BLD AUTO: 95 % (ref 50–75)
NRBC BLD AUTO-RTO: 0 % (ref 0–2)
PLATELET # BLD EST: NORMAL 10*3/UL
PLATELET # BLD: 249 K/UL (ref 130–400)
PMV BLD AUTO: 7.5 FL (ref 7.2–11.7)
RBC # BLD AUTO: 3.88 MIL/UL (ref 3.8–5.2)
TOTAL CELLS COUNTED BLD: 100
WBC # BLD AUTO: 12.6 K/UL (ref 4.8–10.8)

## 2018-05-04 RX ADMIN — IPRATROPIUM BROMIDE AND ALBUTEROL SULFATE SCH ML: .5; 3 SOLUTION RESPIRATORY (INHALATION) at 01:25

## 2018-05-04 RX ADMIN — METHYLPREDNISOLONE SODIUM SUCCINATE SCH: 40 INJECTION, POWDER, FOR SOLUTION INTRAMUSCULAR; INTRAVENOUS at 17:12

## 2018-05-04 RX ADMIN — IPRATROPIUM BROMIDE AND ALBUTEROL SULFATE SCH ML: .5; 3 SOLUTION RESPIRATORY (INHALATION) at 03:57

## 2018-05-04 RX ADMIN — Medication SCH MG: at 10:14

## 2018-05-04 RX ADMIN — IPRATROPIUM BROMIDE AND ALBUTEROL SULFATE SCH: .5; 3 SOLUTION RESPIRATORY (INHALATION) at 11:19

## 2018-05-04 RX ADMIN — IPRATROPIUM BROMIDE AND ALBUTEROL SULFATE SCH ML: .5; 3 SOLUTION RESPIRATORY (INHALATION) at 07:35

## 2018-05-04 NOTE — CP.PCM.DIS
<Kaitlyn Garnica - Last Filed: 05/04/18 10:13>





Provider





- Provider


Date of Admission: 


05/01/18 21:11





Attending physician: 


Rohith Jorgensen MD





Primary care physician: 


Dr. Meyer


Consults: 





Dr. Scott (GI)


Time Spent in preparation of Discharge (in minutes): 40





Diagnosis





- Discharge Diagnosis


(1) LLQ pain


Status: Resolved   





(2) GERD (gastroesophageal reflux disease)


Status: Chronic   





(3) Abnormal transaminases


Status: Acute   


Comment: trending down, patient with follow up with PMD. Hepatitis and HIV 

Negative.   





(4) COPD (chronic obstructive pulmonary disease)


Status: Chronic   





Hospital Course





- Lab Results


Lab Results: 


 Micro Results





05/01/18 21:10   Blood   Blood Culture - Preliminary


                            NO GROWTH AFTER 48 HOURS


05/01/18 20:40   Blood   Blood Culture - Preliminary


                            NO GROWTH AFTER 48 HOURS


05/02/18 11:36   Stool   Ova and Parasite Concentrate Exam - Final





 Most Recent Lab Values











WBC  12.6 K/uL (4.8-10.8)  H  05/04/18  08:56    


 


RBC  3.88 Mil/uL (3.80-5.20)   05/04/18  08:56    


 


Hgb  12.1 g/dL (11.0-16.0)   05/04/18  08:56    


 


Hct  35.5 % (34.0-47.0)   05/04/18  08:56    


 


MCV  91.4 fL (81.0-99.0)   05/04/18  08:56    


 


MCH  31.1 pg (27.0-31.0)  H  05/04/18  08:56    


 


MCHC  34.1 g/dL (33.0-37.0)   05/04/18  08:56    


 


RDW  13.4 % (11.5-14.5)   05/04/18  08:56    


 


Plt Count  249 K/uL (130-400)   05/04/18  08:56    


 


MPV  7.5 fL (7.2-11.7)   05/04/18  08:56    


 


Neut % (Auto)  94.6 % (50.0-75.0)  H  05/04/18  08:56    


 


Lymph % (Auto)  3.2 % (20.0-40.0)  L  05/04/18  08:56    


 


Mono % (Auto)  2.0 % (0.0-10.0)   05/04/18  08:56    


 


Eos % (Auto)  0.0 % (0.0-4.0)   05/04/18  08:56    


 


Baso % (Auto)  0.2 % (0.0-2.0)   05/04/18  08:56    


 


Neut # (Auto)  11.9 K/uL (1.8-7.0)  H  05/04/18  08:56    


 


Lymph # (Auto)  0.4 K/uL (1.0-4.3)  L  05/04/18  08:56    


 


Mono # (Auto)  0.3 K/uL (0.0-0.8)   05/04/18  08:56    


 


Eos # (Auto)  0.0 K/uL (0.0-0.7)   05/04/18  08:56    


 


Baso # (Auto)  0.0 K/uL (0.0-0.2)   05/04/18  08:56    


 


Neutrophils % (Manual)  95 % (50-75)  H  05/04/18  08:56    


 


Band Neutrophils %  2 % (0-2)   05/03/18  08:52    


 


Lymphocytes % (Manual)  3 % (20-40)  L  05/04/18  08:56    


 


Monocytes % (Manual)  2 % (0-10)   05/04/18  08:56    


 


Platelet Estimate  Normal  (NORMAL)   05/04/18  08:56    


 


PT  12.8 SECONDS (9.7-12.2)  H  05/01/18  18:28    


 


INR  1.1   05/01/18  18:28    


 


APTT  30 SECONDS (21-34)   05/01/18  18:28    


 


Sodium  145 mmol/L (132-148)   05/04/18  08:56    


 


Potassium  3.8 mmol/L (3.6-5.2)   05/04/18  08:56    


 


Chloride  108 mmol/L ()  H  05/04/18  08:56    


 


Carbon Dioxide  22 mmol/L (22-30)   05/04/18  08:56    


 


Anion Gap  19  (10-20)   05/04/18  08:56    


 


BUN  17 mg/dL (7-17)   05/04/18  08:56    


 


Creatinine  0.6 mg/dL (0.7-1.2)  L  05/04/18  08:56    


 


Est GFR ( Amer)  > 60   05/04/18  08:56    


 


Est GFR (Non-Af Amer)  > 60   05/04/18  08:56    


 


Random Glucose  177 mg/dL ()  H  05/04/18  08:56    


 


Hemoglobin A1c  5.8 % (4.2-6.5)   05/02/18  06:59    


 


Calcium  8.8 mg/dl (8.6-10.4)   05/04/18  08:56    


 


Total Bilirubin  0.5 mg/dL (0.2-1.3)   05/04/18  08:56    


 


AST  63 U/L (14-36)  H D 05/04/18  08:56    


 


ALT  119 U/L (9-52)  H D 05/04/18  08:56    


 


Alkaline Phosphatase  120 U/L ()   05/04/18  08:56    


 


Troponin I  < 0.0120 ng/mL (0.00-0.120)   05/01/18  18:28    


 


Total Protein  6.7 g/dL (6.3-8.3)   05/04/18  08:56    


 


Albumin  3.6 g/dL (3.5-5.0)   05/04/18  08:56    


 


Globulin  3.1 gm/dL (2.2-3.9)   05/04/18  08:56    


 


Albumin/Globulin Ratio  1.2  (1.0-2.1)   05/04/18  08:56    


 


Triglycerides  65 mg/dL (0-149)   05/02/18  06:59    


 


Cholesterol  142 mg/dL (0-199)   05/02/18  06:59    


 


LDL Cholesterol Direct  55 mg/dL (0-129)   05/02/18  06:59    


 


HDL Cholesterol  54 mg/dL (30-70)   05/02/18  06:59    


 


Lipase  35 U/L ()   05/01/18  18:28    


 


Urine Color  Cheyanne  (YELLOW)   05/01/18  18:21    


 


Urine Clarity  Hazy  (Clear)   05/01/18  18:21    


 


Urine pH  5.0  (5.0-8.0)   05/01/18  18:21    


 


Ur Specific Gravity  1.029  (1.003-1.030)   05/01/18  18:21    


 


Urine Protein  Negative mg/dL (NEGATIVE)   05/01/18  18:21    


 


Urine Glucose (UA)  Normal mg/dL (Normal)   05/01/18  18:21    


 


Urine Ketones  Negative mg/dL (NEGATIVE)   05/01/18  18:21    


 


Urine Blood  Trace  (NEGATIVE)  H  05/01/18  18:21    


 


Urine Nitrate  Negative  (NEGATIVE)   05/01/18  18:21    


 


Urine Bilirubin  Negative  (NEGATIVE)   05/01/18  18:21    


 


Urine Urobilinogen  Normal mg/dL (0.2-1.0)   05/01/18  18:21    


 


Ur Leukocyte Esterase  Trace Gabbie/uL (Negative)   05/01/18  18:21    


 


Urine WBC (Auto)  2 /hpf (0-5)   05/01/18  18:21    


 


Urine RBC (Auto)  2 /hpf (0-3)   05/01/18  18:21    


 


Ur Squamous Epith Cells  1 /hpf (0-5)   05/01/18  18:21    


 


Stool Occult Blood  Positive  (NEGATIVE)  H  05/02/18  11:55    


 


Stool Leukocytes, Qual  Negative  (NEGATIVE)   05/01/18  22:03    


 


Anti-Mitochondrial Ab  Negative  (Negative)   05/02/18  13:55    


 


Anti-Smooth Muscle Ab  Negative  (Negative)   05/02/18  13:55    


 


C. difficile Ag & Toxin  Negative  (NEGATIVE)   05/02/18  10:14    


 


Hepatitis A IgM Ab  Negative  (NEGATIVE)   05/02/18  06:59    


 


Hepatitis A Ab Total  Antibody pos  (NEGATIVE)   05/02/18  13:55    


 


Hep Bs Antigen  Negative  (NEGATIVE)   05/02/18  13:55    


 


Hep Bs Antibody  Negative  (NEGATIVE)   05/02/18  13:55    


 


Hep B Core IgM Ab  Negative  (NEGATIVE)   05/02/18  13:55    


 


Hepatitis C Antibody  Negative  (NEGATIVE)   05/02/18  13:55    


 


HIV 1&2 Antibody Screen  Negative  (NEGATIVE)   05/03/18  08:52    


 


Blood Type  O POSITIVE   05/01/18  18:28    


 


Antibody Screen  Negative   05/01/18  18:28    














- Hospital Course


Hospital Course: 





HPI: "This patient is a 68yo F w/ a PMhx of COPD/Asthma, former smoker 50+ pack 

years quit 10 years ago who is coming to the hospital with a 1d history of LLQ 

pain. She denies fevers/chills, HA, CP, SOB, N/V/D, dysuria/frequency/urgency, 

or lower extremity pain/swelling. She has never had pain like this before. She 

went to her PMD, who told her to come to the ER today. She was recently in 

[facility], 3 months ago, and states that she had "stomach problems" there and 

took unknown antibiotics which made it better. Other than that, she has never 

had this before. 


As per patient her stools were "dark and tarry" black in color, every time she 

went to the bathroom. Of note, ER did rectal and found brown stool with a 

negative FOBT. "





Patient was admitted for colitis and started on ceftriaxone and metronidazole. 

GI, Dr. Scott, was consulted. Patient had an EGD and colonoscopy done which 

showed: Hiatal hernia with GERD but no esophagitis. Mild antral gastritis, 

biopsy taken for H pylori. No bleeding. Multiple superficial ulcers throughout 

ascending colon and cecum. Biopsies taken. No bleeding, mass. Non bleeding 

internal hemorrhoids. 


Patient to avoid Aspirin and NSAIDs and to follow up with GI in two weeks. Upon 

discharge patient had no abdominal pain, nausea, vomiting, diarrhea or 

constipation. 





Patient's liver enzymes were found to be elevated. Patient tested negative for 

hepatitis and HIV. Patient's liver enzymes trending down and will need to 

follow up with her PMD. 





Patient was treated with Duonebs for her COPD will be sent home with a 

prescriptions for Advair and Albuterol INH. Upon discharge patient had no SOB 

and wheezing had improved. 





This is a summary of the patient's hospital course, please see chart for 

details. 











Discharge Exam





- Additional Findings


Additional findings: 


- Constitutional


Appears: Well





- Head Exam


Head Exam: ATRAUMATIC, NORMAL INSPECTION, NORMOCEPHALIC





- Eye Exam


Eye Exam: EOMI, Normal appearance, PERRL


Pupil Exam: NORMAL ACCOMODATION, PERRL





- ENT Exam


ENT Exam: Mucous Membranes Moist, Normal Exam





- Neck Exam


Neck Exam: Full ROM, Normal Inspection.  absent: Lymphadenopathy





- Respiratory Exam


Respiratory Exam: Clear to Ausculation Bilateral, NORMAL BREATHING PATTERN





- Cardiovascular Exam


Cardiovascular Exam: REGULAR RHYTHM, +S1, +S2.  absent: Murmur





- GI/Abdominal Exam


GI & Abdominal Exam: Soft, Normal Bowel Sounds.  absent: Tenderness





- Extremities Exam


Extremities Exam: Full ROM, Normal Capillary Refill, Normal Inspection.  absent

: Joint Swelling, Pedal Edema





- Back Exam


Back Exam: NORMAL INSPECTION





- Neurological Exam


Neurological Exam: Alert, Awake, CN II-XII Intact, Normal Gait, Oriented x3





- Psychiatric Exam


Psychiatric exam: Normal Affect, Normal Mood





- Skin


Skin Exam: Dry, Intact, Normal Color, Warm








Discharge Plan





- Discharge Medications


Prescriptions: 


Albuterol Sulfate [Proair Respiclick] 90 mcg IH Q6H PRN #1 [email or web address]


 PRN Reason: Shortness Of Breath


Famotidine [Pepcid] 20 mg PO DAILY #30 tab


Fluticasone/Salmeterol [Advair 250-50 Diskus] 1 each IH Q12H #1 blst.w.dev





- Follow Up Plan


Condition: STABLE


Disposition: HOME/ ROUTINE


Additional Instructions: 


The following instructions were explained to patient and copy will need to be 

provided to her upon discharge:





1). Schedule follow up with Gastroenterologist Dr. Yung Scott by calling his 

office [phone]. Through his office you will need to follow up the biopsy 

results of your Endoscopy and Colonoscopy. His office is located at [address] [place] [address] [place] [address].





2). Please schedule follow up with your Primary Care Physician Dr. Meyer for 

coordination of your health care especially for further management of your Neck 

and Left Shoulder pain. As Dr. Meyer is prescribing Tramadol, please follow up 

with him concerning future prescriptions. Please bring your discharge paperwork 

with you to your appointment with Dr. Meyer for his review.





3). The following prescriptions were provided to you. Please have them filled 

at your pharmacy on your way home from the hospital and use them as instructed:





Pepcid 20 mg, 1 tablet by mouth 1x/day (breakfast), Dispense #30, NO refills


Advair 250/50 mcg, 1 puff inhalation by mouth 2x/day (breakfast and dinner), 

Dispense #1, NO refills


Albuterol 90 mcg, 2 puff inhalation by mouth every 6 hours ONLY as needed for 

shortness of breath or wheezing, Dispense #1, NO refills





4). DO NOT take any NSAIDS (Motrin, Ibuprofen, Naprosyn, Avil) and DO NOT take 

Aspirin.





5). Please take care and be well. You are a very sweet person.





Referrals: 


Yung Scott MD [Staff Provider] - 


Hilario Meyer MD [Staff Provider] - 





<Rohith Jorgensen - Last Filed: 05/04/18 19:11>





Provider





- Provider


Date of Admission: 


05/01/18 21:11





Attending physician: 


Rohith Jorgensen MD








Hospital Course





- Lab Results


Lab Results: 


 Micro Results





05/02/18 11:36   Stool   Stool Culture - Final


                            NO SALMONELLA, SHIGELLA OR CAMPYLOBACTER ISOLATED.


05/02/18 11:36   Stool   Ova and Parasite Concentrate Exam - Final


05/01/18 21:10   Blood   Blood Culture - Preliminary


                            NO GROWTH AFTER 48 HOURS


05/01/18 20:40   Blood   Blood Culture - Preliminary


                            NO GROWTH AFTER 48 HOURS





 Most Recent Lab Values











WBC  12.6 K/uL (4.8-10.8)  H  05/04/18  08:56    


 


RBC  3.88 Mil/uL (3.80-5.20)   05/04/18  08:56    


 


Hgb  12.1 g/dL (11.0-16.0)   05/04/18  08:56    


 


Hct  35.5 % (34.0-47.0)   05/04/18  08:56    


 


MCV  91.4 fL (81.0-99.0)   05/04/18  08:56    


 


MCH  31.1 pg (27.0-31.0)  H  05/04/18  08:56    


 


MCHC  34.1 g/dL (33.0-37.0)   05/04/18  08:56    


 


RDW  13.4 % (11.5-14.5)   05/04/18  08:56    


 


Plt Count  249 K/uL (130-400)   05/04/18  08:56    


 


MPV  7.5 fL (7.2-11.7)   05/04/18  08:56    


 


Neut % (Auto)  94.6 % (50.0-75.0)  H  05/04/18  08:56    


 


Lymph % (Auto)  3.2 % (20.0-40.0)  L  05/04/18  08:56    


 


Mono % (Auto)  2.0 % (0.0-10.0)   05/04/18  08:56    


 


Eos % (Auto)  0.0 % (0.0-4.0)   05/04/18  08:56    


 


Baso % (Auto)  0.2 % (0.0-2.0)   05/04/18  08:56    


 


Neut # (Auto)  11.9 K/uL (1.8-7.0)  H  05/04/18  08:56    


 


Lymph # (Auto)  0.4 K/uL (1.0-4.3)  L  05/04/18  08:56    


 


Mono # (Auto)  0.3 K/uL (0.0-0.8)   05/04/18  08:56    


 


Eos # (Auto)  0.0 K/uL (0.0-0.7)   05/04/18  08:56    


 


Baso # (Auto)  0.0 K/uL (0.0-0.2)   05/04/18  08:56    


 


Neutrophils % (Manual)  95 % (50-75)  H  05/04/18  08:56    


 


Band Neutrophils %  2 % (0-2)   05/03/18  08:52    


 


Lymphocytes % (Manual)  3 % (20-40)  L  05/04/18  08:56    


 


Monocytes % (Manual)  2 % (0-10)   05/04/18  08:56    


 


Platelet Estimate  Normal  (NORMAL)   05/04/18  08:56    


 


PT  12.8 SECONDS (9.7-12.2)  H  05/01/18  18:28    


 


INR  1.1   05/01/18  18:28    


 


APTT  30 SECONDS (21-34)   05/01/18  18:28    


 


Sodium  145 mmol/L (132-148)   05/04/18  08:56    


 


Potassium  3.8 mmol/L (3.6-5.2)   05/04/18  08:56    


 


Chloride  108 mmol/L ()  H  05/04/18  08:56    


 


Carbon Dioxide  22 mmol/L (22-30)   05/04/18  08:56    


 


Anion Gap  19  (10-20)   05/04/18  08:56    


 


BUN  17 mg/dL (7-17)   05/04/18  08:56    


 


Creatinine  0.6 mg/dL (0.7-1.2)  L  05/04/18  08:56    


 


Est GFR ( Amer)  > 60   05/04/18  08:56    


 


Est GFR (Non-Af Amer)  > 60   05/04/18  08:56    


 


Random Glucose  177 mg/dL ()  H  05/04/18  08:56    


 


Hemoglobin A1c  5.8 % (4.2-6.5)   05/02/18  06:59    


 


Calcium  8.8 mg/dl (8.6-10.4)   05/04/18  08:56    


 


Total Bilirubin  0.5 mg/dL (0.2-1.3)   05/04/18  08:56    


 


AST  63 U/L (14-36)  H D 05/04/18  08:56    


 


ALT  119 U/L (9-52)  H D 05/04/18  08:56    


 


Alkaline Phosphatase  120 U/L ()   05/04/18  08:56    


 


Troponin I  < 0.0120 ng/mL (0.00-0.120)   05/01/18  18:28    


 


Total Protein  6.7 g/dL (6.3-8.3)   05/04/18  08:56    


 


Albumin  3.6 g/dL (3.5-5.0)   05/04/18  08:56    


 


Globulin  3.1 gm/dL (2.2-3.9)   05/04/18  08:56    


 


Albumin/Globulin Ratio  1.2  (1.0-2.1)   05/04/18  08:56    


 


Triglycerides  65 mg/dL (0-149)   05/02/18  06:59    


 


Cholesterol  142 mg/dL (0-199)   05/02/18  06:59    


 


LDL Cholesterol Direct  55 mg/dL (0-129)   05/02/18  06:59    


 


HDL Cholesterol  54 mg/dL (30-70)   05/02/18  06:59    


 


Lipase  35 U/L ()   05/01/18  18:28    


 


Urine Color  Cheyanne  (YELLOW)   05/01/18  18:21    


 


Urine Clarity  Hazy  (Clear)   05/01/18  18:21    


 


Urine pH  5.0  (5.0-8.0)   05/01/18  18:21    


 


Ur Specific Gravity  1.029  (1.003-1.030)   05/01/18  18:21    


 


Urine Protein  Negative mg/dL (NEGATIVE)   05/01/18  18:21    


 


Urine Glucose (UA)  Normal mg/dL (Normal)   05/01/18  18:21    


 


Urine Ketones  Negative mg/dL (NEGATIVE)   05/01/18  18:21    


 


Urine Blood  Trace  (NEGATIVE)  H  05/01/18  18:21    


 


Urine Nitrate  Negative  (NEGATIVE)   05/01/18  18:21    


 


Urine Bilirubin  Negative  (NEGATIVE)   05/01/18  18:21    


 


Urine Urobilinogen  Normal mg/dL (0.2-1.0)   05/01/18  18:21    


 


Ur Leukocyte Esterase  Trace Gabbie/uL (Negative)   05/01/18  18:21    


 


Urine WBC (Auto)  2 /hpf (0-5)   05/01/18  18:21    


 


Urine RBC (Auto)  2 /hpf (0-3)   05/01/18  18:21    


 


Ur Squamous Epith Cells  1 /hpf (0-5)   05/01/18  18:21    


 


Stool Occult Blood  Positive  (NEGATIVE)  H  05/02/18  11:55    


 


Stool Leukocytes, Qual  Negative  (NEGATIVE)   05/01/18  22:03    


 


NUZHAT 6 Profile  Negative  (NEGATIVE)   05/02/18  13:55    


 


Anti-Mitochondrial Ab  Negative  (Negative)   05/02/18  13:55    


 


Anti-Smooth Muscle Ab  Negative  (Negative)   05/02/18  13:55    


 


C. difficile Ag & Toxin  Negative  (NEGATIVE)   05/02/18  10:14    


 


Hepatitis A IgM Ab  Negative  (NEGATIVE)   05/02/18  06:59    


 


Hepatitis A Ab Total  Antibody pos  (NEGATIVE)   05/02/18  13:55    


 


Hep Bs Antigen  Negative  (NEGATIVE)   05/02/18  13:55    


 


Hep Bs Antibody  Negative  (NEGATIVE)   05/02/18  13:55    


 


Hep B Core IgM Ab  Negative  (NEGATIVE)   05/02/18  13:55    


 


Hepatitis C Antibody  Negative  (NEGATIVE)   05/02/18  13:55    


 


HIV 1&2 Antibody Screen  Negative  (NEGATIVE)   05/03/18  08:52    


 


Blood Type  O POSITIVE   05/01/18  18:28    


 


Antibody Screen  Negative   05/01/18  18:28    














Attending/Attestation





- Attestation


I have personally seen and examined this patient.: Yes


I have fully participated in the care of the patient.: Yes


I have reviewed all pertinent clinical information, including history, physical 

exam and plan: Yes


Notes (Text): 





05/04/18 19:11


Please also see my note 5/4/18.





Rohith Jorgensen D.O.

## 2018-05-04 NOTE — CP.PCM.PN
Subjective





- Date & Time of Evaluation


Date of Evaluation: 05/04/18


Time of Evaluation: 09:00





- Subjective


Subjective: 





Patient was seen and examined at 9:00 AM 5/4/18 371 A





Upon FULL ROS


NO dysphagia/odynopahgia


NO soreness in throat


NO cough


NO sinus/nasal congestion


NO fever/chills


NO muscle aches/pains


(+) Joint Pain: involving the cervical neck radiating to the left shoulder. 

This is a chronic issue for which PMD Dr. Meyer provides her with Tramadol. 

She has also had steroid injections to the cervical neck area. She states that 

she is planning for surgery to the Left Shoulder towards end of June 2018. She 

will follow up with Dr. Meyer for further Tramadol Rx.


NO chest pain/palpations


NO SOB


NO n/v/d/c: toleratin diet and moving bowels normally


NO burning pain with urination


NO HA


NO lightheadedness/dizziness


NO paresthesias


NO new changes in vision


NO new changes in hearing





Exam:


General: AAOX3, NAD


HEENT: NCA, EOMI, PERRLA, NO cervical/supraclavicular/submandibular 

lymphadenopathy, NO pharyngeal erythema/exudate, Nasal Turbinates are 

nonerythematous/nonedematous, Oral Mucosa is moist


Cardio: NS1 and NS2, NO M/R/G


Resp: CTA B/L, NO R/R/W


GI: BSx4, Soft, NT, NO HSM, NO guarding/rebound tenderness


Ext: Pulses are strong and equal, Capillary Refill is 2 seconds, NO edema


Neuro: CN II through XII are grossly intact





Assessments:





1). LLQ Abdominal Pain: resolved. Hepatitis Panel is negative (IgM Hepatitis A 

was negative). HIV negative. Stool O&P negative. Anti Mitochondrial Ab and Anti 

Smooth Muscle Ab negative. C. diff negative. Upper Endoscopy showed GERD. 

Colonoscopy showed multiple superficial ulcerations throughout cecum and 

ascending colon. Bxs taken durine Endoscopy and Colonoscopy which she will 

follow up with GI Dr. Guerrero as outpatient. She will refrain from NSAIDs and ASA





2). Abnormal LFTs: Hepatitis and HIV Negative. This will have to be further 

trended through PMD Dr. Meyer office





3). GERD: will send home on Pepcid 20 mg PO 1x/day





4). Hx COPD: will provide Rx for Advair and Albuterol INH 





The following instructions were explained to patient and copy will need to be 

provided to her upon discharge:





1). Schedule follow up with Gastroenterologist Dr. Yung Scott by calling his 

office [phone]. Through his office you will need to follow up the biopsy 

results of your Endoscopy and Colonoscopy. His office is located at [address] [place] [address] [place] [address].





2). Please schedule follow up with your Primary Care Physician Dr. Meyer for 

coordination of your health care especially for further management of your Neck 

and Left Shoulder pain. As Dr. Meyer is prescribing Tramadol, please follow up 

with him concerning future prescriptions. Please bring your discharge paperwork 

with you to your appointment with Dr. Meyer for his review.





3). The following prescriptions were provided to you. Please have them filled 

at your pharmacy on your way home from the hospital and use them as instructed:





Pepcid 20 mg, 1 tablet by mouth 1x/day (breakfast), Dispense #30, NO refills


Advair 250/50 mcg, 1 puff inhalation by mouth 2x/day (breakfast and dinner), 

Dispense #1, NO refills


Albuterol 90 mcg, 2 puff inhalation by mouth every 6 hours ONLY as needed for 

shortness of breath or wheezing, Dispense #1, NO refills





4). DO NOT take any NSAIDS (Motrin, Ibuprofen, Naprosyn, Avil) and DO NOT take 

Aspirin.





5). Please take care and be well. You are very sweet person.





Rohith Jorgensen D.O.





Objective





- Vital Signs/Intake and Output


Vital Signs (last 24 hours): 


 











Temp Pulse Resp BP Pulse Ox


 


 98.2 F   86   20   125/76   96 


 


 05/04/18 08:53  05/04/18 08:53  05/04/18 08:53  05/04/18 08:53  05/04/18 08:53








Intake and Output: 


 











 05/04/18 05/04/18





 06:59 18:59


 


Intake Total 150 


 


Balance 150 














- Medications


Medications: 


 Current Medications





Albuterol/Ipratropium (Duoneb 3 Mg/0.5 Mg (3 Ml) Ud)  3 ml INH RQ4 [facility]


   Last Admin: 05/04/18 07:35 Dose:  3 ml


Ciprofloxacin (Cipro)  500 mg PO BID [facility]AL


   PRN Reason: Protocol


   Last Admin: 05/03/18 17:00 Dose:  500 mg


Methylprednisolone (Solu-Medrol)  40 mg IVP Q12H [facility]


   Last Admin: 05/03/18 23:55 Dose:  40 mg


Pantoprazole Sodium (Protonix Inj)  40 mg IVP DAILY [facility]


   Last Admin: 05/03/18 16:56 Dose:  40 mg


Saccharomyces Boulardii (Florastor)  250 mg PO BID [facility]


   Last Admin: 05/03/18 17:01 Dose:  250 mg











- Labs


Labs: 


 





 05/04/18 08:56 





 05/04/18 08:56 





 











PT  12.8 SECONDS (9.7-12.2)  H  05/01/18  18:28    


 


INR  1.1   05/01/18  18:28    


 


APTT  30 SECONDS (21-34)   05/01/18  18:28

## 2019-03-23 ENCOUNTER — HOSPITAL ENCOUNTER (OUTPATIENT)
Dept: HOSPITAL 14 - H.ER | Age: 69
Setting detail: OBSERVATION
LOS: 1 days | Discharge: HOME | End: 2019-03-24
Attending: INTERNAL MEDICINE | Admitting: INTERNAL MEDICINE
Payer: MEDICARE

## 2019-03-23 VITALS — BODY MASS INDEX: 26.7 KG/M2

## 2019-03-23 DIAGNOSIS — R20.2: ICD-10-CM

## 2019-03-23 DIAGNOSIS — M51.37: ICD-10-CM

## 2019-03-23 DIAGNOSIS — Z87.891: ICD-10-CM

## 2019-03-23 DIAGNOSIS — J44.9: ICD-10-CM

## 2019-03-23 DIAGNOSIS — Z88.6: ICD-10-CM

## 2019-03-23 DIAGNOSIS — R20.0: ICD-10-CM

## 2019-03-23 DIAGNOSIS — M51.17: Primary | ICD-10-CM

## 2019-03-23 LAB
ALBUMIN SERPL-MCNC: 3.8 G/DL (ref 3.5–5)
ALBUMIN/GLOB SERPL: 1.2 {RATIO} (ref 1–2.1)
ALT SERPL-CCNC: 48 U/L (ref 9–52)
AST SERPL-CCNC: 31 U/L (ref 14–36)
BASOPHILS # BLD AUTO: 0 K/UL (ref 0–0.2)
BASOPHILS NFR BLD: 0.7 % (ref 0–2)
BUN SERPL-MCNC: 13 MG/DL (ref 7–17)
CALCIUM SERPL-MCNC: 9.2 MG/DL (ref 8.4–10.2)
EOSINOPHIL # BLD AUTO: 0.2 K/UL (ref 0–0.7)
EOSINOPHIL NFR BLD: 3.8 % (ref 0–4)
ERYTHROCYTE [DISTWIDTH] IN BLOOD BY AUTOMATED COUNT: 13.7 % (ref 11.5–14.5)
GFR NON-AFRICAN AMERICAN: > 60
HGB BLD-MCNC: 13.2 G/DL (ref 12–16)
LYMPHOCYTES # BLD AUTO: 1.6 K/UL (ref 1–4.3)
LYMPHOCYTES NFR BLD AUTO: 34.3 % (ref 20–40)
MCH RBC QN AUTO: 30.7 PG (ref 27–31)
MCHC RBC AUTO-ENTMCNC: 33.4 G/DL (ref 33–37)
MCV RBC AUTO: 92 FL (ref 81–99)
MONOCYTES # BLD: 0.5 K/UL (ref 0–0.8)
MONOCYTES NFR BLD: 10 % (ref 0–10)
NEUTROPHILS # BLD: 2.4 K/UL (ref 1.8–7)
NEUTROPHILS NFR BLD AUTO: 51.2 % (ref 50–75)
NRBC BLD AUTO-RTO: 0.2 % (ref 0–0)
PLATELET # BLD: 233 K/UL (ref 130–400)
PMV BLD AUTO: 6.5 FL (ref 7.2–11.7)
RBC # BLD AUTO: 4.29 MIL/UL (ref 3.8–5.2)
WBC # BLD AUTO: 4.7 K/UL (ref 4.8–10.8)

## 2019-03-23 PROCEDURE — 96375 TX/PRO/DX INJ NEW DRUG ADDON: CPT

## 2019-03-23 PROCEDURE — 80048 BASIC METABOLIC PNL TOTAL CA: CPT

## 2019-03-23 PROCEDURE — 80053 COMPREHEN METABOLIC PANEL: CPT

## 2019-03-23 PROCEDURE — 72131 CT LUMBAR SPINE W/O DYE: CPT

## 2019-03-23 PROCEDURE — 85025 COMPLETE CBC W/AUTO DIFF WBC: CPT

## 2019-03-23 PROCEDURE — 36415 COLL VENOUS BLD VENIPUNCTURE: CPT

## 2019-03-23 PROCEDURE — 96376 TX/PRO/DX INJ SAME DRUG ADON: CPT

## 2019-03-23 PROCEDURE — 99285 EMERGENCY DEPT VISIT HI MDM: CPT

## 2019-03-23 PROCEDURE — 96374 THER/PROPH/DIAG INJ IV PUSH: CPT

## 2019-03-23 NOTE — ED PDOC
HPI: Back


Time Seen by Provider: 03/23/19 19:18


Chief Complaint (Nursing): Back Pain


Chief Complaint (Provider): Back Pain


History Per: Patient


History/Exam Limitations: no limitations


Onset/Duration Of Symptoms: Days (x14)


Current Symptoms Are (Timing): Still Present


Additional Complaint(s): 


67 y/o  female with a PMHx of Asthma and Chronic Low Back Pain presents 

to the ED for evaluation of low back pain, onset two weeks ago. Patient reports 

of being diagnosed with Degenerative Disk Disease and herniated disks in L3, L4 

and L5 and is scheduled for a surgical fusion in 2 weeks. Patient notes pain is 

increasing worsening since onset. Patient reports of taking diclofenac and 

tramadol with no relief of symptoms. Patient states she is unable to sleep due 

to pain. Patient reports that while en route to hospital she fell on her back. 

Patient notes pain radiates down the left buttock and left leg. Patient 

additionally reports of developing numbness and tingling down both legs (left > 

right). Otherwise, patient denies urinary retention or incontinence, fecal 

retention or incontinence and saddle anesthesias. 








PMD: Emmett Lentz





Past Medical History


Reviewed: Historical Data, Nursing Documentation, Vital Signs


Vital Signs: 


                                Last Vital Signs











Temp  98.3 F   03/23/19 18:16


 


Pulse  86   03/23/19 18:16


 


Resp  16   03/23/19 18:16


 


BP  153/74 H  03/23/19 18:16


 


Pulse Ox  100   03/23/19 18:16














- Medical History


PMH: Asthma, Back Problems, COPD, Chronic Pain (LOW BACK PAIN)


   Denies: Crohn's Disease, Diverticulitis, Gastritis, Gall Bladder Disease, 

HIV, Pancreatitis, Chronic Kidney Disease


Other PMH: Degenerative Disk Disease and Herniated Disks





- Surgical History


Surgical History: Cholecystectomy


Other surgeries: Tubal Ligation





- Family History


Family History: States: Unknown Family Hx





- Immunization History


Hx Tetanus Toxoid Vaccination: No


Hx Influenza Vaccination: No


Hx Pneumococcal Vaccination: Yes (4-years ago)





- Home Medications


Home Medications: 


                                Ambulatory Orders











 Medication  Instructions  Recorded


 


traMADol [Ultram] 50 mg PO BID 05/01/18


 


Albuterol Sulfate [Proair 90 mcg IH Q6H PRN #1 [email or web address] 05/04/18





Respiclick]  


 


Famotidine [Pepcid] 20 mg PO DAILY #30 tab 05/04/18


 


Fluticasone/Salmeterol [Advair 1 each IH Q12H #1 blst.w.dev 05/04/18





250-50 Diskus]  














- Allergies


Allergies/Adverse Reactions: 


                                    Allergies











Allergy/AdvReac Type Severity Reaction Status Date / Time


 


codeine AdvReac  DIARRHEA Verified 03/23/19 18:14














Review of Systems


ROS Statement: Except As Marked, All Systems Reviewed And Found Negative


Genitourinary Female: Negative for: Incontinence (fecal and urinary), Other 

(fecal and urinary retention)


Musculoskeletal: Positive for: Back Pain (low), Leg Pain


Neurological: Positive for: Numbness (and tingling in the bilateral legs)





Physical Exam





- Reviewed


Nursing Documentation Reviewed: Yes


Vital Signs Reviewed: Yes





- Physical Exam


Appears: Positive for: Uncomfortable


Head Exam: Positive for: ATRAUMATIC, NORMOCEPHALIC


Skin: Positive for: Normal Color, Warm, Dry


Eye Exam: Positive for: Normal appearance, EOMI, PERRL


Neck: Positive for: Normal, Painless ROM


Cardiovascular/Chest: Positive for: Regular Rate, Rhythm.  Negative for: Murmur


Respiratory: Positive for: Normal Breath Sounds.  Negative for: Respiratory 

Distress


Gastrointestinal/Abdominal: Positive for: Normal Exam, Soft.  Negative for: 

Tenderness


Back: Negative for: L CVA Tenderness, R CVA Tenderness, Vertebral Tenderness


Extremity: Positive for: Normal ROM, Other (Unable to perform leg raise due to 

being uncomfortable. ).  Negative for: Deformity


Neurological/Psych: Positive for: Awake, Alert, Oriented.  Negative for: 

Motor/Sensory Deficits, Other





- ECG


O2 Sat by Pulse Oximetry: 100 (RA)


Pulse Ox Interpretation: Normal





Medical Decision Making


Medical Decision Making: 


Time: 1934


Impression: 67 y/o  female with acute on chronic low back pain


-- Trial IV of Morphine


-- Valium


-- Imaging ordered due to fall





-- CT Lumbar Spine w/o Contrast


-- CMP


-- CBC with Differentials


-- Lidoderm 1 ea TD


-- Morphine 2 mg IVP


-- Valium 5 mg PO


-- Heplock Insertion





Time: 2013


CT LUMBAR RESULTS


History: Fall.  Back pain. 





Comparison: None 





Technique: CT examination lumbosacral spine. 





CT examination lumbosacral spine was made using thin axial sections.  Images 

were reconstructed in the coronal sagittal planes. 





There is straightening of the lumbar spine.  Vertebral body stature is 

maintained throughout.  There is diffuse advanced hypertrophic and degenerative 

change with disc space narrowing at all levels within the lumbar spine worse at 

the L5-S1 level. 











L1-L2 level posterior spurring the vertebral bodies.  Posterior disc bulge 

present. 





L2-L3 level bony spurring posterior vertebral bodies.  Posterior disc bulge 

present. 





L3-L4 level bony spurring of vertebral bodies without vik disc herniation.  

Neural foramen mildly narrowed. 





L4-L5 level small posterior disc herniation associated bony spurring.  

Neuroforamina are mildly narrowed. 





L5-S1 level small posterior disc herniation.  Neural foramina appear narrowed.  

Spinal cord itself appears within normal limits.  Spinal canal appears 

preserved. 





Impression: Diffuse advanced hypertrophic and degenerative change with chronic 

disc disease at all levels within the lumbar spine worse at the L5-S1 level.  P

osterior bulging disc associated bony spurring multiple levels.  Small posterior

disc herniations suspected at the L4-L5 and L5-S1 levels.  Clinical correlation 

advised.


 


Electronically signed on Mar 23, 2019 8:13:32 PM EDT by:


Emeterio Alejandro M.D., Certified by [facility], Diagnostic Radiology


 








 

________________________________________________________________________________


_______________


Scribe Attestation:


Documented by Iain Dubon, acting as a scribe Alfonso Cortez MD.





Provider Scribe Attestation:


All medical record entries made by the Scribe were at my direction and 

personally dictated by me. I have reviewed the chart and agree that the record 

accurately reflects my personal performance of the history, physical exam, 

medical decision making, and the department course for this patient. I have also

personally directed, reviewed, and agree with the discharge instructions and 

disposition.





Disposition





- Clinical Impression


Clinical Impression: 


 Back pain








- Disposition


Disposition Time: 07:00


Condition: STABLE


Forms:  Beep (English)

## 2019-03-23 NOTE — CP.PCM.HP
<Salima Martin - Last Filed: 19 01:31>





History of Present Illness





- History of Present Illness


History of Present Illness: 





69 yo F with past med hx asthma and chronic low back pain due to disk 

herniations admitted due to intractable back pain. Patient presented to ED for 

evaluation of low back pain, onset two weeks ago.  She reports that she has been

diagnosed with herniated disks in her lower back and has seen pain management 

specialist (Dr. Marrero) and spinal surgeon (Dr. LINDA Ignacio) with planned spinal 

surgery in the coming month, however unsure in which hospital. She states that 

this past week her pain got much worse and she could not tolerate it anymore and

came to ED. States she takes tramadol at home, 50 mg tablets, and has been 

taking 2 at a time two times a day without relief.





States she has been unable to sleep because of pain. Rates pain 10/10 on 

admission, and 8/10 after receiving 2 gm morphine in ED. Pain is located in mid 

lumbar area, travels down left leg. States she has developing numbness in left 

leg. Denies loss of bladder/bowel function or any saddle anesthesia. She states 

her pain is not aggravated by anything specific but feels like putting some pr

essure on the area helps minimally. Denies chest pain, shortness of breath, 

abdominal pain, urinary/bowel issues.





Of note, patient states that while in en route to the hospital she was getting 

out of the car and misstepped and fell on her left side. No LOC, no trauma to 

head, no dizziness/vertigo.





PMD: Emmett Brewer








Past Med hx: asthma, sciatica, herniated disks


Past Surg hx: , cholecystectomy, BTL, left shoulder surgery


Family hx: htn


Social hx: former smoker (quit 10-15 yrs ago), denies alcohol/drug use


Allergies: codeine (states she gets low blood pressure)


Meds: albuterol inhaler (uses less than 1x a mo usually), tramadol 50 mg BID 

daily





IN ED:


Vitals stable: /74, HR 86, T 98.3, RR 16, O2 sat 100


Labs: CBC and CMP grossly unremarkable


CT lumbar spine taken; prelim read: Impression: Diffuse advanced hypertrophic 

and degenerative change with chronic disc disease at all levels within the 

lumbar spine worse at the L5-S1 level.  Posterior bulging disc associated bony 

spurring multiple levels.  Small posterior disc herniations suspected at the L4-

L5 and L5-S1 levels.  Clinical correlation advised.





Received 2 mg morphine, lidocaine patch, and 5 mg valium.








Present on Admission





- Present on Admission


Any Indicators Present on Admission: No





Review of Systems





- Review of Systems


Review of Systems: 





as per hpi





Past Patient History





- Past Medical History & Family History


Past Medical History?: Yes





- Past Social History


Smoking Status: Former Smoker


Alcohol: None


Drugs: Denies





- CARDIAC


Hx Cardiac Disorders: No





- PULMONARY


Hx Asthma: Yes





- NEUROLOGICAL


Hx Neurological Disorder: No





- HEENT


Hx HEENT Problems: No





- RENAL


Hx Chronic Kidney Disease: No





- ENDOCRINE/METABOLIC


Hx Endocrine Disorders: No





- HEMATOLOGICAL/ONCOLOGICAL


Hx Human Immunodeficiency Virus (HIV): No





- INTEGUMENTARY


Hx Dermatological Problems: No





- MUSCULOSKELETAL/RHEUMATOLOGICAL


Hx Musculoskeletal Disorders: Yes


Hx Degenerative Joint Disease: Yes (Cervical/left shoulder)


Hx Falls: No


Hx Herniated Disk: Yes





- GASTROINTESTINAL


Hx Crohn's Disease: No


Hx Diverticulitis: No


Hx Gall Bladder Disease: No


Hx Gastritis: No


Hx Pancreatitis: No





- GENITOURINARY/GYNECOLOGICAL


Hx Genitourinary Disorders: No





- PSYCHIATRIC


Hx Psychophysiologic Disorder: No


Hx Substance Use: No





- SURGICAL HISTORY


Hx  Section: Yes


Hx Cholecystectomy: Yes


Other/Comment: BTL, shoulder surgery





- ANESTHESIA


Hx Anesthesia: Yes


Hx Anesthesia Reactions: No





Meds


Allergies/Adverse Reactions: 


                                    Allergies











Allergy/AdvReac Type Severity Reaction Status Date / Time


 


codeine AdvReac  DIARRHEA Verified 19 18:14














Physical Exam





- Constitutional


Appears: Non-toxic


Additional comments: 





uncomfortable, crying





- Head Exam


Head Exam: NORMAL INSPECTION





- Eye Exam


Eye Exam: Normal appearance





- ENT Exam


ENT Exam: Mucous Membranes Moist





- Respiratory Exam


Respiratory Exam: Clear to Auscultation Bilateral, NORMAL BREATHING PATTERN.  

absent: Respiratory Distress





- Cardiovascular Exam


Cardiovascular Exam: REGULAR RHYTHM, +S1, +S2





- GI/Abdominal Exam


GI & Abdominal Exam: Normal Bowel Sounds, Soft.  absent: Tenderness





- Extremities Exam


Extremities exam: Positive for: normal inspection.  Negative for: calf 

tenderness, pedal edema


Additional comments: 





+ straight leg on LEFT








- Neurological Exam


Neurological exam: Alert, Oriented x3





- Psychiatric Exam


Psychiatric exam: Anxious, Normal Affect





- Skin


Skin Exam: Dry, Warm





Results





- Vital Signs


Recent Vital Signs: 





                                Last Vital Signs











Temp  98.3 F   19 18:16


 


Pulse  86   19 18:16


 


Resp  16   19 18:16


 


BP  153/74 H  19 18:16


 


Pulse Ox  100   19 20:33














- Labs


Result Diagrams: 


                                 19 20:55





                                 19 20:55


Labs: 





                         Laboratory Results - last 24 hr











  19





  20:55 20:55


 


WBC  4.7 L 


 


RBC  4.29 


 


Hgb  13.2 


 


Hct  39.5 


 


MCV  92.0 


 


MCH  30.7 


 


MCHC  33.4 


 


RDW  13.7 


 


Plt Count  233 


 


MPV  6.5 L 


 


Neut % (Auto)  51.2 


 


Lymph % (Auto)  34.3 


 


Mono % (Auto)  10.0 


 


Eos % (Auto)  3.8 


 


Baso % (Auto)  0.7 


 


Neut # (Auto)  2.4 


 


Lymph # (Auto)  1.6 


 


Mono # (Auto)  0.5 


 


Eos # (Auto)  0.2 


 


Baso # (Auto)  0.0 


 


Sodium   142


 


Potassium   4.4


 


Chloride   105


 


Carbon Dioxide   29


 


Anion Gap   12


 


BUN   13


 


Creatinine   0.6 L


 


Est GFR ( Amer)   > 60


 


Est GFR (Non-Af Amer)   > 60


 


Random Glucose   103


 


Calcium   9.2


 


Total Bilirubin   0.5


 


AST   31


 


ALT   48


 


Alkaline Phosphatase   95


 


Total Protein   6.9


 


Albumin   3.8


 


Globulin   3.1


 


Albumin/Globulin Ratio   1.2














Assessment & Plan





- Assessment and Plan (Free Text)


Assessment: 





69 yo F with PMH asthma and diffuse advanced hypertrophic and degenerative 

spinal changes and chronic disc disease at all levels within the lumbar spine, 

admitted due to intractable back pain.


Plan: 








Intractable Back Pain


- Likely due to diffuse advanced hypertrophic and degenerative spinal changes 

with chronic disc disease at all levels within the lumbar spine; seen on CT scan


- Pain control


- Pain management consult 





Asthma


- Well controlled


- Albuterol Q6 PRN





Diet


- Regular diet





Prophylaxis


- Pepcid


- SCDs for now





Pt seen/examined with Dr. Lorenzo.





<Sohail Lorenzo - Last Filed: 19 05:37>





Results





- Vital Signs


Recent Vital Signs: 





                                Last Vital Signs











Temp  98.3 F   19 03:24


 


Pulse  76   19 04:00


 


Resp  18   19 04:00


 


BP  127/66   19 03:24


 


Pulse Ox  98   19 04:00














- Labs


Result Diagrams: 


                                 19 20:55





                                 19 20:55


Labs: 





                         Laboratory Results - last 24 hr











  19





  20:55 20:55


 


WBC  4.7 L 


 


RBC  4.29 


 


Hgb  13.2 


 


Hct  39.5 


 


MCV  92.0 


 


MCH  30.7 


 


MCHC  33.4 


 


RDW  13.7 


 


Plt Count  233 


 


MPV  6.5 L 


 


Neut % (Auto)  51.2 


 


Lymph % (Auto)  34.3 


 


Mono % (Auto)  10.0 


 


Eos % (Auto)  3.8 


 


Baso % (Auto)  0.7 


 


Neut # (Auto)  2.4 


 


Lymph # (Auto)  1.6 


 


Mono # (Auto)  0.5 


 


Eos # (Auto)  0.2 


 


Baso # (Auto)  0.0 


 


Sodium   142


 


Potassium   4.4


 


Chloride   105


 


Carbon Dioxide   29


 


Anion Gap   12


 


BUN   13


 


Creatinine   0.6 L


 


Est GFR ( Amer)   > 60


 


Est GFR (Non-Af Amer)   > 60


 


Random Glucose   103


 


Calcium   9.2


 


Total Bilirubin   0.5


 


AST   31


 


ALT   48


 


Alkaline Phosphatase   95


 


Total Protein   6.9


 


Albumin   3.8


 


Globulin   3.1


 


Albumin/Globulin Ratio   1.2














Attending/Attestation





- Attestation


I have personally seen and examined this patient.: Yes


I have fully participated in the care of the patient.: Yes


I have reviewed all pertinent clinical information: Yes


Notes (Text): 





19 05:25


I saw, examined and discussed this patient with Dr Martin. The assessment and 

plan outlined above reflect my direct input. This is a 68 years with hx of 

chronic back pain, comes with worsening pain to the back  radiating down the 

left leg over the past week. She is due for back surgery in 2019 but 

came because of the intensity of the pain.


We will treat and control the intractable lower back pain and Sciatica of 

chronic degenerative lumbar disc disease and  herniation, with Morphine and 

Tramadol. We will consult with pain management.





Soahil Lorenzo MD

## 2019-03-24 VITALS
DIASTOLIC BLOOD PRESSURE: 84 MMHG | SYSTOLIC BLOOD PRESSURE: 146 MMHG | HEART RATE: 72 BPM | TEMPERATURE: 97.9 F | RESPIRATION RATE: 20 BRPM | OXYGEN SATURATION: 100 %

## 2019-03-24 LAB
BUN SERPL-MCNC: 20 MG/DL (ref 7–17)
CALCIUM SERPL-MCNC: 8.9 MG/DL (ref 8.4–10.2)
GFR NON-AFRICAN AMERICAN: > 60

## 2019-03-24 NOTE — CP.PCM.DIS
Provider





- Provider


Date of Admission: 


03/23/19 21:23





Attending physician: 


Sohail Lorenzo





Primary care physician: 


Dr. Lentz


Consults: 


none


Time Spent in preparation of Discharge (in minutes): 20





Hospital Course





- Lab Results


Lab Results: 


                             Most Recent Lab Values











WBC  4.7 K/uL (4.8-10.8)  L  03/23/19  20:55    


 


RBC  4.29 Mil/uL (3.80-5.20)   03/23/19  20:55    


 


Hgb  13.2 g/dL (12.0-16.0)   03/23/19  20:55    


 


Hct  39.5 % (34.0-47.0)   03/23/19  20:55    


 


MCV  92.0 fl (81.0-99.0)   03/23/19  20:55    


 


MCH  30.7 pg (27.0-31.0)   03/23/19  20:55    


 


MCHC  33.4 g/dL (33.0-37.0)   03/23/19  20:55    


 


RDW  13.7 % (11.5-14.5)   03/23/19  20:55    


 


Plt Count  233 K/uL (130-400)   03/23/19  20:55    


 


MPV  6.5 fl (7.2-11.7)  L  03/23/19  20:55    


 


Neut % (Auto)  51.2 % (50.0-75.0)   03/23/19  20:55    


 


Lymph % (Auto)  34.3 % (20.0-40.0)   03/23/19  20:55    


 


Mono % (Auto)  10.0 % (0.0-10.0)   03/23/19  20:55    


 


Eos % (Auto)  3.8 % (0.0-4.0)   03/23/19  20:55    


 


Baso % (Auto)  0.7 % (0.0-2.0)   03/23/19  20:55    


 


Neut # (Auto)  2.4 K/uL (1.8-7.0)   03/23/19  20:55    


 


Lymph # (Auto)  1.6 K/uL (1.0-4.3)   03/23/19  20:55    


 


Mono # (Auto)  0.5 K/uL (0.0-0.8)   03/23/19  20:55    


 


Eos # (Auto)  0.2 K/uL (0.0-0.7)   03/23/19  20:55    


 


Baso # (Auto)  0.0 K/uL (0.0-0.2)   03/23/19  20:55    


 


Sodium  139 mmol/l (132-148)   03/24/19  06:30    


 


Potassium  4.1 MMOL/L (3.6-5.0)   03/24/19  06:30    


 


Chloride  104 mmol/L ()   03/24/19  06:30    


 


Carbon Dioxide  30 mmol/L (22-30)   03/24/19  06:30    


 


Anion Gap  9  (10-20)  L  03/24/19  06:30    


 


BUN  20 mg/dl (7-17)  H  03/24/19  06:30    


 


Creatinine  0.7 mg/dl (0.7-1.2)   03/24/19  06:30    


 


Est GFR ( Amer)  > 60   03/24/19  06:30    


 


Est GFR (Non-Af Amer)  > 60   03/24/19  06:30    


 


Random Glucose  91 mg/dL ()   03/24/19  06:30    


 


Calcium  8.9 mg/dL (8.4-10.2)   03/24/19  06:30    


 


Total Bilirubin  0.5 mg/dl (0.2-1.3)   03/23/19  20:55    


 


AST  31 U/L (14-36)   03/23/19  20:55    


 


ALT  48 U/L (9-52)   03/23/19  20:55    


 


Alkaline Phosphatase  95 U/L ()   03/23/19  20:55    


 


Total Protein  6.9 G/DL (6.3-8.2)   03/23/19  20:55    


 


Albumin  3.8 g/dL (3.5-5.0)   03/23/19  20:55    


 


Globulin  3.1 gm/dL (2.2-3.9)   03/23/19  20:55    


 


Albumin/Globulin Ratio  1.2  (1.0-2.1)   03/23/19  20:55    














- Hospital Course


Hospital Course: 


69 yo F with PMH asthma and chronic low back pain due to disk herniations 

admitted due to intractable back pain. She stated that was not able to sleep. 

Denied any saddle anesthesia, bowel or urinary incontinence.


She was placed under observation in med/surg for sciatica  and intractable low 

back pain . Given Morphine IV with good response.


Today she feels mucghbetter. Will discharge patient home with follow upw ith her

spine surgeon, pain management doctor  and PMD Dr. Lentz


She has been scheduled for spinal surgery at the end of April


D/c patient home 








Dx 


1.Intractable low back pain secondary to herniate disc and sciatica





Discharge Exam





- Head Exam


Head Exam: NORMAL INSPECTION





- Eye Exam


Eye Exam: EOMI, Normal appearance, PERRL


Pupil Exam: NORMAL ACCOMODATION





- ENT Exam


ENT Exam: Mucous Membranes Moist, Normal Exam





- Neck Exam


Neck exam: Full Rom





- Respiratory Exam


Respiratory Exam: Clear to PA & Lateral, NORMAL BREATHING PATTERN.  absent: 

Rhonchi, Wheezes, Respiratory Distress





- Cardiovascular Exam


Cardiovascular Exam: REGULAR RHYTHM, RRR, +S1, +S2.  absent: JVD





- GI/Abdominal Exam


GI & Abdominal Exam: Normal Bowel Sounds, Soft.  absent: Distended, Guarding, 

Rebound, Tenderness





- Rectal Exam


Rectal Exam: Deferred





- Extremities Exam


Extremities exam: normal capillary refill, normal inspection, pedal pulses 

present





- Back Exam


Back exam: NORMAL INSPECTION





- Neurological Exam


Neurological exam: Alert, CN II-XII Intact, Oriented x3, Reflexes Normal





- Psychiatric Exam


Psychiatric exam: Normal Affect, Normal Mood





- Skin


Skin Exam: Dry, Intact, Normal Color, Warm





Discharge Plan





- Follow Up Plan


Condition: STABLE


Disposition: HOME/ ROUTINE


Patient education suggested?: Yes


Instructions:  Sciatica (DC)


Referrals: 


Emmett Lentz MD [Family Provider] -

## 2022-08-24 NOTE — CT
Date of service: 



03/23/2019



PROCEDURE:  CT Lumbar Spine without contrast



HISTORY:

Pain 



COMPARISON:

None available.



TECHNIQUE:

Axial computed tomography images were obtained of the lumbar spine 

without the use of intravenous contrast. Coronal and sagittal 

reformatted images were created and reviewed. 



Radiation dose:



Total exam DLP = 587.43 mGy-cm.



This CT exam was performed using one or more of the following dose 

reduction techniques: Automated exposure control, adjustment of the 

mA and/or kV according to patient size, and/or use of iterative 

reconstruction technique.



FINDINGS:



VERTEBRAE:

No evidence of acute compression fractures no retropulsed fragments.  

Vertebral bodies exhibit normal stature of.  Vertebral bodies and 

facets normally aligned. 



DISCS/SPINAL CANAL/NEURAL FORAMINA:

L1-2:    Marked disc space narrowing with vacuum disc phenomena.  

Small irregular disc herniation ridge results in mild compressive 

effects on the thecal sac bilaterally.. There also appears to be some 

inferior subligamentous extension of disc over short distance within 

the right anterolateral aspect of the canal dorsal to the superior 

aspect of the L2 segment. Disc extends slightly into the proximal 

inferior margins of both exit foramina however exit foramina adequate 



L2-3:    Disc space narrowing more so along the posterior disc 

margin. Small amount of vacuum disc phenomena present. Broad-based 

disc herniation ridge complex larger on the right than left also 

compresses the ventral surface of the thecal sac.. Disc also extends 

into the proximal inferior margins of both exit foramina however exit 

foramina adequate 



L3-4:    Disc desiccation and disc space narrowing more so along the 

posterior disc margin with vacuum disc phenomena. Small broad-based 

disc ridge complex mildly compress the ventral surface of the thecal 

sac. 



L4-5:    Disc desiccation and disc space narrowing and vacuum disc 

phenomena.. Broad-based disc ridge complex compresses the ventral 

surface of the thecal sac. Exit foramina appear adequate. 



L5-S1:  Disc desiccation and disc space narrowing with vacuum disc 

phenomena. Central and bilateral disc bulge flattens the ventral 

surface of the thecal sac. Facets are hypertrophic. Exit foramina 

stenotic bilaterally. 



PARASPINAL SOFT TISSUES:

Unremarkable. 



OTHER FINDINGS:

None. 



IMPRESSION:

No acute fractures. Multilevel degenerative spondylosis with small 

disc herniation seen at several levels mentioned above. The exit 

foramina are also quite stenotic the L5-S1 level bilaterally Surgery PA